# Patient Record
Sex: MALE | Race: WHITE | NOT HISPANIC OR LATINO | Employment: FULL TIME | URBAN - METROPOLITAN AREA
[De-identification: names, ages, dates, MRNs, and addresses within clinical notes are randomized per-mention and may not be internally consistent; named-entity substitution may affect disease eponyms.]

---

## 2022-08-26 ENCOUNTER — OFFICE VISIT (OUTPATIENT)
Dept: INTERNAL MEDICINE | Facility: CLINIC | Age: 47
End: 2022-08-26
Payer: COMMERCIAL

## 2022-08-26 ENCOUNTER — HOSPITAL ENCOUNTER (OUTPATIENT)
Dept: RADIOLOGY | Facility: HOSPITAL | Age: 47
Discharge: HOME | End: 2022-08-26
Attending: INTERNAL MEDICINE
Payer: COMMERCIAL

## 2022-08-26 VITALS
HEART RATE: 70 BPM | TEMPERATURE: 98 F | RESPIRATION RATE: 12 BRPM | WEIGHT: 273 LBS | SYSTOLIC BLOOD PRESSURE: 126 MMHG | HEIGHT: 69 IN | DIASTOLIC BLOOD PRESSURE: 70 MMHG | BODY MASS INDEX: 40.43 KG/M2

## 2022-08-26 DIAGNOSIS — Z12.11 COLON CANCER SCREENING: ICD-10-CM

## 2022-08-26 DIAGNOSIS — G89.29 CHRONIC PAIN OF BOTH KNEES: ICD-10-CM

## 2022-08-26 DIAGNOSIS — M25.562 CHRONIC PAIN OF BOTH KNEES: ICD-10-CM

## 2022-08-26 DIAGNOSIS — M25.561 CHRONIC PAIN OF BOTH KNEES: ICD-10-CM

## 2022-08-26 DIAGNOSIS — E66.01 OBESITY, CLASS III, BMI 40-49.9 (MORBID OBESITY): ICD-10-CM

## 2022-08-26 DIAGNOSIS — Z00.00 ANNUAL PHYSICAL EXAM: Primary | ICD-10-CM

## 2022-08-26 DIAGNOSIS — F98.8 ATTENTION DEFICIT DISORDER, UNSPECIFIED HYPERACTIVITY PRESENCE: ICD-10-CM

## 2022-08-26 PROBLEM — E66.813 OBESITY, CLASS III, BMI 40-49.9 (MORBID OBESITY): Status: ACTIVE | Noted: 2022-08-26

## 2022-08-26 PROCEDURE — 3008F BODY MASS INDEX DOCD: CPT | Mod: CPTII,S$GLB,, | Performed by: INTERNAL MEDICINE

## 2022-08-26 PROCEDURE — 99999 PR PBB SHADOW E&M-NEW PATIENT-LVL IV: CPT | Mod: PBBFAC,,, | Performed by: INTERNAL MEDICINE

## 2022-08-26 PROCEDURE — 3078F PR MOST RECENT DIASTOLIC BLOOD PRESSURE < 80 MM HG: ICD-10-PCS | Mod: CPTII,S$GLB,, | Performed by: INTERNAL MEDICINE

## 2022-08-26 PROCEDURE — 1159F PR MEDICATION LIST DOCUMENTED IN MEDICAL RECORD: ICD-10-PCS | Mod: CPTII,S$GLB,, | Performed by: INTERNAL MEDICINE

## 2022-08-26 PROCEDURE — 4010F ACE/ARB THERAPY RXD/TAKEN: CPT | Mod: CPTII,S$GLB,, | Performed by: INTERNAL MEDICINE

## 2022-08-26 PROCEDURE — 73562 X-RAY EXAM OF KNEE 3: CPT | Mod: 26,,, | Performed by: STUDENT IN AN ORGANIZED HEALTH CARE EDUCATION/TRAINING PROGRAM

## 2022-08-26 PROCEDURE — 4010F PR ACE/ARB THEARPY RXD/TAKEN: ICD-10-PCS | Mod: CPTII,S$GLB,, | Performed by: INTERNAL MEDICINE

## 2022-08-26 PROCEDURE — 3074F SYST BP LT 130 MM HG: CPT | Mod: CPTII,S$GLB,, | Performed by: INTERNAL MEDICINE

## 2022-08-26 PROCEDURE — 3074F PR MOST RECENT SYSTOLIC BLOOD PRESSURE < 130 MM HG: ICD-10-PCS | Mod: CPTII,S$GLB,, | Performed by: INTERNAL MEDICINE

## 2022-08-26 PROCEDURE — 3008F PR BODY MASS INDEX (BMI) DOCUMENTED: ICD-10-PCS | Mod: CPTII,S$GLB,, | Performed by: INTERNAL MEDICINE

## 2022-08-26 PROCEDURE — 1159F MED LIST DOCD IN RCRD: CPT | Mod: CPTII,S$GLB,, | Performed by: INTERNAL MEDICINE

## 2022-08-26 PROCEDURE — 99386 PR PREVENTIVE VISIT,NEW,40-64: ICD-10-PCS | Mod: S$GLB,,, | Performed by: INTERNAL MEDICINE

## 2022-08-26 PROCEDURE — 99386 PREV VISIT NEW AGE 40-64: CPT | Mod: S$GLB,,, | Performed by: INTERNAL MEDICINE

## 2022-08-26 PROCEDURE — 73562 X-RAY EXAM OF KNEE 3: CPT | Mod: TC,50,PO

## 2022-08-26 PROCEDURE — 99999 PR PBB SHADOW E&M-NEW PATIENT-LVL IV: ICD-10-PCS | Mod: PBBFAC,,, | Performed by: INTERNAL MEDICINE

## 2022-08-26 PROCEDURE — 3078F DIAST BP <80 MM HG: CPT | Mod: CPTII,S$GLB,, | Performed by: INTERNAL MEDICINE

## 2022-08-26 PROCEDURE — 73562 XR KNEE ORTHO BILAT: ICD-10-PCS | Mod: 26,,, | Performed by: STUDENT IN AN ORGANIZED HEALTH CARE EDUCATION/TRAINING PROGRAM

## 2022-08-26 RX ORDER — LISINOPRIL AND HYDROCHLOROTHIAZIDE 10; 12.5 MG/1; MG/1
1 TABLET ORAL DAILY
COMMUNITY
Start: 2022-08-19 | End: 2022-11-11 | Stop reason: SDUPTHER

## 2022-08-26 RX ORDER — METHYLPHENIDATE HYDROCHLORIDE 20 MG/1
20 CAPSULE, EXTENDED RELEASE ORAL DAILY PRN
COMMUNITY
Start: 2022-07-21 | End: 2022-09-07 | Stop reason: SDUPTHER

## 2022-08-26 NOTE — PROGRESS NOTES
Subjective:       Patient ID: Chi Carr is a 47 y.o. male.    Chief Complaint: Establish Care    HPI   47 y.o. Male here for annual exam.     Vaccines: Influenza (declined); Tetanus (will consider)  Eye exam: current   Colonoscopy: needs    Past Medical History:  No date: ADHD (attention deficit hyperactivity disorder)  No date: Hypertension  No date: Morbid obesity with BMI of 40.0-44.9, adult  No date: Other insomnia  No date: Prediabetes  History reviewed. No pertinent surgical history.  Social History    Socioeconomic History      Marital status:       Number of children: 1    Tobacco Use      Smoking status: Never Smoker      Smokeless tobacco: Never Used    Substance and Sexual Activity      Alcohol use: Yes        Comment: social      Drug use: Not Currently      Sexual activity: Yes        Partners: Female    Social History Narrative           Review of patient's allergies indicates:  No Known Allergies  Casimiro Carr had no medications administered during this visit.    Review of Systems   Constitutional: Negative for activity change, appetite change, chills, diaphoresis, fatigue, fever and unexpected weight change.   HENT: Negative for nasal congestion, hearing loss, mouth sores, postnasal drip, rhinorrhea, sinus pressure/congestion, sneezing, sore throat, trouble swallowing and voice change.    Eyes: Negative for discharge, itching and visual disturbance.   Respiratory: Negative for cough, chest tightness, shortness of breath and wheezing.    Cardiovascular: Negative for chest pain, palpitations and leg swelling.   Gastrointestinal: Negative for abdominal pain, blood in stool, constipation, diarrhea, nausea and vomiting.   Endocrine: Negative for cold intolerance, heat intolerance, polydipsia and polyuria.   Genitourinary: Negative for difficulty urinating, dysuria, flank pain, hematuria and urgency.   Musculoskeletal: Positive for arthralgias. Negative for back pain, joint swelling,  myalgias and neck pain.   Integumentary:  Negative for rash and wound.   Allergic/Immunologic: Negative for environmental allergies and food allergies.   Neurological: Negative for dizziness, tremors, seizures, syncope, weakness and headaches.   Hematological: Negative for adenopathy. Does not bruise/bleed easily.   Psychiatric/Behavioral: Negative for confusion, dysphoric mood, sleep disturbance and suicidal ideas. The patient is not nervous/anxious.          Objective:      Physical Exam  Constitutional:       General: He is not in acute distress.     Appearance: He is well-developed. He is not diaphoretic.   HENT:      Head: Normocephalic and atraumatic.      Right Ear: External ear normal.      Left Ear: External ear normal.      Nose: Nose normal.      Mouth/Throat:      Pharynx: No oropharyngeal exudate.   Eyes:      General: No scleral icterus.        Right eye: No discharge.         Left eye: No discharge.      Conjunctiva/sclera: Conjunctivae normal.      Pupils: Pupils are equal, round, and reactive to light.   Neck:      Thyroid: No thyromegaly.      Vascular: No JVD.   Cardiovascular:      Rate and Rhythm: Normal rate and regular rhythm.      Heart sounds: Normal heart sounds. No murmur heard.  Pulmonary:      Effort: Pulmonary effort is normal. No respiratory distress.      Breath sounds: Normal breath sounds. No wheezing or rales.   Abdominal:      General: Bowel sounds are normal. There is no distension.      Palpations: Abdomen is soft.      Tenderness: There is no abdominal tenderness. There is no guarding.   Musculoskeletal:      Cervical back: Normal range of motion and neck supple.      Right lower leg: No edema.      Left lower leg: No edema.   Lymphadenopathy:      Cervical: No cervical adenopathy.   Skin:     General: Skin is warm and dry.      Coloration: Skin is not pale.      Findings: No rash.   Neurological:      Mental Status: He is alert and oriented to person, place, and time.    Psychiatric:         Judgment: Judgment normal.         Assessment:       Problem List Items Addressed This Visit        Psychiatric    Attention deficit disorder       Endocrine    Obesity, Class III, BMI 40-49.9 (morbid obesity)      Other Visit Diagnoses     Annual physical exam    -  Primary    Relevant Orders    CBC Auto Differential    Comprehensive Metabolic Panel    TSH    Lipid Panel    Urinalysis    PSA, Screening    Hemoglobin A1C    Chronic pain of both knees        Relevant Orders    Ambulatory referral/consult to Orthopedics    X-Ray Knee 3 View Bilateral    Colon cancer screening        Relevant Orders    Case Request Endoscopy: COLONOSCOPY (Completed)          Plan:    Blood work ordered     Morbid Obesity- proper diet/exercise stressed     HTN- stable on Prinzide 10-12.5 mg qd      Chronic B/L knee pain- X-rays and referral to ortho     ADD- stable on Ritalin PRN     F/u in 1 yr

## 2022-08-29 ENCOUNTER — OFFICE VISIT (OUTPATIENT)
Dept: SPORTS MEDICINE | Facility: CLINIC | Age: 47
End: 2022-08-29
Payer: COMMERCIAL

## 2022-08-29 VITALS
BODY MASS INDEX: 40.14 KG/M2 | HEART RATE: 61 BPM | WEIGHT: 271 LBS | SYSTOLIC BLOOD PRESSURE: 129 MMHG | HEIGHT: 69 IN | DIASTOLIC BLOOD PRESSURE: 82 MMHG

## 2022-08-29 DIAGNOSIS — M25.561 CHRONIC PAIN OF BOTH KNEES: ICD-10-CM

## 2022-08-29 DIAGNOSIS — M25.562 CHRONIC PAIN OF BOTH KNEES: ICD-10-CM

## 2022-08-29 DIAGNOSIS — G89.29 CHRONIC PAIN OF BOTH KNEES: ICD-10-CM

## 2022-08-29 PROCEDURE — 3074F SYST BP LT 130 MM HG: CPT | Mod: CPTII,S$GLB,, | Performed by: ORTHOPAEDIC SURGERY

## 2022-08-29 PROCEDURE — 99999 PR PBB SHADOW E&M-EST. PATIENT-LVL III: CPT | Mod: PBBFAC,,, | Performed by: ORTHOPAEDIC SURGERY

## 2022-08-29 PROCEDURE — 3079F PR MOST RECENT DIASTOLIC BLOOD PRESSURE 80-89 MM HG: ICD-10-PCS | Mod: CPTII,S$GLB,, | Performed by: ORTHOPAEDIC SURGERY

## 2022-08-29 PROCEDURE — 3008F BODY MASS INDEX DOCD: CPT | Mod: CPTII,S$GLB,, | Performed by: ORTHOPAEDIC SURGERY

## 2022-08-29 PROCEDURE — 3079F DIAST BP 80-89 MM HG: CPT | Mod: CPTII,S$GLB,, | Performed by: ORTHOPAEDIC SURGERY

## 2022-08-29 PROCEDURE — 99204 OFFICE O/P NEW MOD 45 MIN: CPT | Mod: S$GLB,,, | Performed by: ORTHOPAEDIC SURGERY

## 2022-08-29 PROCEDURE — 3044F PR MOST RECENT HEMOGLOBIN A1C LEVEL <7.0%: ICD-10-PCS | Mod: CPTII,S$GLB,, | Performed by: ORTHOPAEDIC SURGERY

## 2022-08-29 PROCEDURE — 1159F MED LIST DOCD IN RCRD: CPT | Mod: CPTII,S$GLB,, | Performed by: ORTHOPAEDIC SURGERY

## 2022-08-29 PROCEDURE — 99999 PR PBB SHADOW E&M-EST. PATIENT-LVL III: ICD-10-PCS | Mod: PBBFAC,,, | Performed by: ORTHOPAEDIC SURGERY

## 2022-08-29 PROCEDURE — 99204 PR OFFICE/OUTPT VISIT, NEW, LEVL IV, 45-59 MIN: ICD-10-PCS | Mod: S$GLB,,, | Performed by: ORTHOPAEDIC SURGERY

## 2022-08-29 PROCEDURE — 3074F PR MOST RECENT SYSTOLIC BLOOD PRESSURE < 130 MM HG: ICD-10-PCS | Mod: CPTII,S$GLB,, | Performed by: ORTHOPAEDIC SURGERY

## 2022-08-29 PROCEDURE — 4010F ACE/ARB THERAPY RXD/TAKEN: CPT | Mod: CPTII,S$GLB,, | Performed by: ORTHOPAEDIC SURGERY

## 2022-08-29 PROCEDURE — 3044F HG A1C LEVEL LT 7.0%: CPT | Mod: CPTII,S$GLB,, | Performed by: ORTHOPAEDIC SURGERY

## 2022-08-29 PROCEDURE — 3008F PR BODY MASS INDEX (BMI) DOCUMENTED: ICD-10-PCS | Mod: CPTII,S$GLB,, | Performed by: ORTHOPAEDIC SURGERY

## 2022-08-29 PROCEDURE — 1159F PR MEDICATION LIST DOCUMENTED IN MEDICAL RECORD: ICD-10-PCS | Mod: CPTII,S$GLB,, | Performed by: ORTHOPAEDIC SURGERY

## 2022-08-29 PROCEDURE — 4010F PR ACE/ARB THEARPY RXD/TAKEN: ICD-10-PCS | Mod: CPTII,S$GLB,, | Performed by: ORTHOPAEDIC SURGERY

## 2022-08-29 NOTE — PROGRESS NOTES
CC: Bilateral knee pain    47 y.o. Male with a history of Bilateral (right>left) knee pain. Pt is self-referred and works primarily as a  but has some job site duties as well. Denies specific HILARY but has experienced several episodes of hyperextension in right knee. Pt rates right knee pain as 1/10 at rest and 6/10 at worst. Pt has given up playing tennis and racquetball due to previous knee injuries. Pt also sleeps with his feet and knees elevated due to sleep apnea. Pt has tried conservative treatment and failed.     - mechanical symptoms, no instability    Is affecting ADLs.      SANE: R: 50, L: 75    Review of Systems   Constitution: Negative. Negative for chills, fever and night sweats.   HENT: Negative for congestion and headaches.    Eyes: Negative for blurred vision, left vision loss and right vision loss.   Cardiovascular: Negative for chest pain and syncope.   Respiratory: Negative for cough and shortness of breath.    Endocrine: Negative for polydipsia, polyphagia and polyuria.   Hematologic/Lymphatic: Negative for bleeding problem. Does not bruise/bleed easily.   Skin: Negative for dry skin, itching and rash.   Musculoskeletal: Negative for falls. Positive for knee pain and muscle weakness.   Gastrointestinal: Negative for abdominal pain and bowel incontinence.   Genitourinary: Negative for bladder incontinence and nocturia.   Neurological: Negative for disturbances in coordination, loss of balance and seizures.   Psychiatric/Behavioral: Negative for depression. The patient does not have insomnia.    Allergic/Immunologic: Negative for hives and persistent infections.     PAST MEDICAL HISTORY:   Past Medical History:   Diagnosis Date    ADHD (attention deficit hyperactivity disorder)     Hypertension     Morbid obesity with BMI of 40.0-44.9, adult     Other insomnia     Prediabetes      PAST SURGICAL HISTORY: History reviewed. No pertinent surgical history.  FAMILY HISTORY:   Family History  "  Problem Relation Age of Onset    Cancer Mother         sarcoma    Prostate cancer Father     Diabetes Maternal Grandmother     Heart disease Neg Hx     Stroke Neg Hx      SOCIAL HISTORY:   Social History     Socioeconomic History    Marital status:     Number of children: 1   Tobacco Use    Smoking status: Never    Smokeless tobacco: Never   Substance and Sexual Activity    Alcohol use: Yes     Comment: social    Drug use: Not Currently    Sexual activity: Yes     Partners: Female   Social History Narrative            MEDICATIONS:   Current Outpatient Medications:     lisinopriL-hydrochlorothiazide (PRINZIDE,ZESTORETIC) 10-12.5 mg per tablet, Take 1 tablet by mouth once daily., Disp: , Rfl:     methylphenidate HCl (RITALIN LA) 20 MG 24 hr capsule, Take 20 mg by mouth daily as needed., Disp: , Rfl:   ALLERGIES: Review of patient's allergies indicates:  No Known Allergies    VITAL SIGNS: /82   Pulse 61   Ht 5' 9" (1.753 m)   Wt 122.9 kg (271 lb)   BMI 40.02 kg/m²      PHYSICAL EXAMINATION  VITAL SIGNS: /82   Pulse 61   Ht 5' 9" (1.753 m)   Wt 122.9 kg (271 lb)   BMI 40.02 kg/m²    General:  The patient is alert and oriented x 3.  Mood is pleasant.  Observation of ears, eyes and nose reveal no gross abnormalities.  HEENT: NCAT, sclera nonicteric  Lungs: Respirations are equal and unlabored.    Bilateral KNEE EXAMINATION     OBSERVATION / INSPECTION   Gait:   Nonantalgic   Alignment:  Neutral   Scars:   None   Muscle atrophy: Mild  Effusion:  None   Warmth:  None   Discoloration:   none     TENDERNESS / CREPITUS (T / C):          T / C      T / C   Patella   - / -   Lateral joint line   - / -    Peripatellar medial  -  Medial joint line    - / -    Peripatellar lateral -  Medial plica   - / -    Patellar tendon -   Popliteal fossa  - / -    Quad tendon   -   Gastrocnemius   -   Prepatellar Bursa - / -   Quadricep   -   Tibial tubercle  -  Thigh/hamstring  -   Pes " anserine/HS -  Fibula    -   ITB   - / -  Tibia     -   Tib/fib joint  - / -  LCL    -     MFC   - / -   MCL: Proximal  -    LFC   - / -    Distal   -          ROM: (* = pain)  PASSIVE   ACTIVE    Left :    0  115   5 / 0 / 110     Right :    5  0 / 115   5 / 0 / 110    PATELLOFEMORAL EXAMINATION:  See above noted areas of tenderness.   Patella position    Subluxation / dislocation: Centered           Sup. / Inf;   Normal   Crepitus (PF):    Absent   Patellar Mobility:       Medial-lateral:   Normal    Superior-inferior:  Normal    Inferior tilt   Normal    Patellar tendon:  Normal   Lateral tilt:    Normal   J-sign:     None   Patellofemoral grind:   No pain       MENISCAL SIGNS:     Pain on terminal extension:  -  Pain on terminal flexion:  -  Efrains maneuver:  - for pain  Squat     - posterior joint pain    LIGAMENT EXAMINATION:  ACL / Lachman:  normal (-1 to 2mm)    PCL-Post.  drawer: normal 0 to 2mm  MCL- Valgus:  normal 0 to 2mm  LCL- Varus:  normal 0 to 2mm  Pivot shift: normal (Equal)   Dial Test: difference c/w other side   At 30° flexion: normal (< 5°)    At 90° flexion: normal (< 5°)   Reverse Pivot Shift:   normal (Equal)     STRENGTH: (* = with pain) PAINFUL SIDE   Quadricep   5/5   Hamstrin/5    EXTREMITY NEURO-VASCULAR EXAMINATION:   Sensation:  Grossly intact to light touch all dermatomal regions.   Motor Function:  Fully intact motor function at hip, knee, foot and ankle    DTRs;  quadriceps and  achilles 2+.  No clonus and downgoing Babinski.    Vascular status:  DP and PT pulses 2+, brisk capillary refill, symmetric.     Other Findings:  + bridge  + step down  Decreased glut activation       X-rays reviewed and interpreted personally by me:  including standing, weight bearing AP and flexion bilateral knees, lateral and merchant views ordered and images reviewed by me show:  No fracture, dislocation     ASSESSMENT:    Bilateral Knee Pain due to glut and core weakness    PLAN:    PT for glut and core strengthening  Follow up PRN  All questions were answered, pt will contact us for questions or concerns in the interim.

## 2022-09-01 ENCOUNTER — CLINICAL SUPPORT (OUTPATIENT)
Dept: REHABILITATION | Facility: HOSPITAL | Age: 47
End: 2022-09-01
Attending: ORTHOPAEDIC SURGERY
Payer: COMMERCIAL

## 2022-09-01 DIAGNOSIS — M25.561 CHRONIC PAIN OF BOTH KNEES: ICD-10-CM

## 2022-09-01 DIAGNOSIS — M25.562 CHRONIC PAIN OF BOTH KNEES: ICD-10-CM

## 2022-09-01 DIAGNOSIS — M25.569 MEDIAL KNEE PAIN, UNSPECIFIED LATERALITY: ICD-10-CM

## 2022-09-01 DIAGNOSIS — G89.29 CHRONIC PAIN OF BOTH KNEES: ICD-10-CM

## 2022-09-01 PROCEDURE — 97140 MANUAL THERAPY 1/> REGIONS: CPT

## 2022-09-01 PROCEDURE — 97110 THERAPEUTIC EXERCISES: CPT

## 2022-09-01 PROCEDURE — 97161 PT EVAL LOW COMPLEX 20 MIN: CPT

## 2022-09-01 NOTE — PLAN OF CARE
[facility]  Physical Therapy Initial Evaluation    Name: Chi Carr  Clinic Number: [id number]    Therapy Diagnosis:   Encounter Diagnosis   Name Primary?    Chronic pain of both knees      Physician: Sharon Cosme MD    Physician Orders: PT Eval and Treat   Medical Diagnosis from Referral: Chronic pain of both knees [M25.561, M25.562, G89.29]  Evaluation Date: 9/1/2022  Authorization Period Expiration: 9/1/2022 - 12/31/2022  Plan of Care Expiration: 10/27/22  Visit # / Visits authorized: 1/ 1    Time In: 1600  Time Out: 1700  Total Billable Time: 60 minutes    Precautions: Standard,     Subjective   Date of onset: 1 year ago  History of current condition - Casimiro reports: he developed his medial bilat knee pain one year ago with no specific incident. States that the Right knee hurts him the most but his left knee hurts as well. States he used to play racquet ball and tennis, falling on his knees a lot but no overall occurrence to elicit this pain. Pt states he hyper extends his knees a lot and plays golf now with moderate pain. Pt denies radicular sx/s and has PMH of low back pain. 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 States he would like to lose weight and get back to PLOF.        Past Medical History:   Diagnosis Date    ADHD (attention deficit hyperactivity disorder)     Hypertension     Morbid obesity with BMI of 40.0-44.9, adult     Other insomnia     Prediabetes      Chi Carr  has no past surgical history on file.    Chi has a current medication list which includes the following prescription(s): lisinopril-hydrochlorothiazide and methylphenidate hcl.    Review of patient's allergies indicates:  No Known Allergies     Imaging,   FINDINGS:  Right knee: No acute fracture or dislocation.  No aggressive osseous lesion.  Slight lateral position of patella in the trochlear groove.  Cartilage spaces are adequately maintained.  No  significant joint effusion.  No significant soft tissue abnormality.     Left knee:.  No acute fracture dislocation.  No aggressive osseous lesion.  Slight lateral positioning of patella in the trochlear groove.  Cartilage spaces are adequately maintained. Mild enthesopathy at distal quadriceps insertion site.  No significant joint effusion.  No significant soft tissue abnormality.    Prior Therapy: none  Social History:  lives with their family  Occupation: sedentary   Prior Level of Function: Pt was able to walk 1 mile, sit to stand and golf with minimal pain.   Current Level of Function: Pt is able to walk .25 mile, sit to stand and golf with 6/10 pain on most occasions.     Pain:  Current 3/10, worst 7/10, best 1/10   Location: bilateral medial knee   Description: Aching  Aggravating Factors: Standing, Touching, Walking, Night Time, Morning, Extension, Flexing, Lifting, and Getting out of bed/chair  Easing Factors: rest    Pts goals: return to ADLs and golf without pain     Objective     Gait   - decrease WB on LLE  - bilat [place]  - decrease Right arm swing and Right thoracic spine rotation    Functional tests:   SL squat: bilat hip drop  DL squat: decrease DF, fwd knee translation    Knee Range of Motion:   Right  Left    Active 5-0-125 10-0-130   Passive 10-0-125 10-0-130     Hip Range of Motion  Flexon : Right= 90 Left = 100  IR: Right = 15 Left= 25  ER: Right = 20 Left= 30     Yasmany test  Right=  post fossa 8'' from table 90 deg knee flexion  Left = post fossa 4'' form table and 75 deg knee flexion     Lower Extremity Strength  Right LE  Left LE    Quadriceps: 4+/5 Quadriceps: 5/5   Hamstrings: 4+/5 Hamstrings: 5/5   Hip flexion (supine): 4/5 Hip flexion (supine): 4/5   Hip extension:  3-/5 Hip extension: 3+/5   Hip ER:  3+/5 Hip ER:  3+/5   Hip IR: 4+/5 Hip IR: 4+/5     Special Tests:   Right Left   Valgus Stress Test - -   Varus Stress test - -   Lachman's test - -   Posterior Lachman      Dianna's Test - -   Thessaly's Test - -   Patellar Grind Test - -     Joint Mobility: hyper mobile of tib-fem joint      Palpation: tender to superficial palpation of medial border of Right patella     Sensation: WFL      CMS Impairment/Limitation/Restriction for FOTO knee  Survey    Therapist reviewed FOTO scores for Chi Carr on 9/1/2022.   FOTO documents entered into [employer] - see Media section.    Limitation Score: 69%  Category: Mobility         TREATMENT     Total Treatment time separate from Evaluation: 42 minutes    Casimiro received therapeutic exercises to develop for 31 minutes including:  Pt edu on hip biomechanics, lumbar referral, neurodynamic, activity modification, POC and HEP  Pronequad stretch  Glute sets 10x 10''  SL clams  2x10 ea  SL hip ER x10 ea  Bridge 2x 10       Casimiro received the following manual therapy techniques: or 11 minutes, including:  Right FA jt. Inf /post/lat mob  distraction    Home Exercises and Patient Education Provided    Education provided re: hip biomechanics, lumbar referral, neurodynamic, activity modification, POC, goals for therapy, role of therapy for care, exercises/HEP    Written Home Exercises Provided: yes.  Exercises were reviewed and Casimiro was able to demonstrate them prior to the end of the session.   Pt received a written copy of exercises to perform at home. Casimiro demonstrated good  understanding of the education provided.     See EMR under patient instructions for exercises given.   Assessment   Chi is a 47 y.o. male referred to outpatient Physical Therapy with a medical diagnosis of Chronic pain of both knees [M25.561, M25.562, G89.292. Pt presents with bilat medial knee pain. Pt has deficits in decreased range of motion, decreased joint mobility, decrease strength/endurance, decrease neuro-muscular control, and increased pain This limits the pts ability to walk, bend kneel, lift, and exercise. Pt is a good candidate for skilled physical  therapy treatment to address the above limitations through manual therapy techniques, neuro- reeducation , strength/endurance program, gait training and patient education .     Pt prognosis is Good.   Pt will benefit from skilled outpatient Physical Therapy to address the deficits stated above and in the chart below, provide pt/family education, and to maximize pt's level of independence.     Plan of care discussed with patient: Yes  Pt's spiritual, cultural and educational needs considered and patient is agreeable to the plan of care and goals as stated below:     Anticipated Barriers for therapy: none    Medical Necessity is demonstrated by the following  History  Co-morbidities and personal factors that may impact the plan of care Co-morbidities:   high BMI    Personal Factors:   no deficits     low   Examination  Body Structures and Functions, activity limitations and participation restrictions that may impact the plan of care Body Regions:   lower extremities    Body Systems:    gross symmetry  ROM  strength  gross coordinated movement  gait  motor control    Participation Restrictions:   none    Activity limitations:   Learning and applying knowledge  no deficits    General Tasks and Commands  no deficits    Communication  no deficits    Mobility  no deficits    Self care  no deficits    Domestic Life  no deficits    Interactions/Relationships  no deficits    Life Areas  no deficits    Community and Social Life  no deficits         low   Clinical Presentation stable and uncomplicated low   Decision Making/ Complexity Score: low     Goals:  Short Term Goals: 2-4 weeks   Pt will be able to demonstrate 5 deg and .5 inch decrease in melissa test of RLE.  Pt will be able to demonstrate 2x10 bridges with proper form.  Pt will be able to demonstrate > 5 deg increase in hip Range of Motion RLE.  Pt will be able to demonstrate walking .25 mile with no pain.    Long Term Goals: 4-8 weeks   Pt will be able to demonstrate  BW squat x10 with equal WB.   Pt will be able to demonstrate walking >1 mile with no pain.  Pt will subjectively report decrease in pain with ADLs.        Plan   Plan of care Certification: 9/1/2022 to 10/27/22.    Outpatient Physical Therapy 2 times weekly for 8 weeks to include the following interventions: Electrical Stimulation ,, Gait Training, Manual Therapy, Moist Heat/ Ice, Neuromuscular Re-ed, Patient Education, Therapeutic Activities, and Therapeutic Exercise.     Page Alarcon, PT, DPT

## 2022-09-06 PROBLEM — M25.569 MEDIAL KNEE PAIN: Status: ACTIVE | Noted: 2022-09-06

## 2022-09-07 ENCOUNTER — CLINICAL SUPPORT (OUTPATIENT)
Dept: REHABILITATION | Facility: HOSPITAL | Age: 47
End: 2022-09-07
Attending: FAMILY MEDICINE
Payer: COMMERCIAL

## 2022-09-07 ENCOUNTER — PATIENT MESSAGE (OUTPATIENT)
Dept: INTERNAL MEDICINE | Facility: CLINIC | Age: 47
End: 2022-09-07
Payer: COMMERCIAL

## 2022-09-07 DIAGNOSIS — M25.561 RIGHT MEDIAL KNEE PAIN: Primary | ICD-10-CM

## 2022-09-07 PROCEDURE — 97140 MANUAL THERAPY 1/> REGIONS: CPT

## 2022-09-07 PROCEDURE — 97110 THERAPEUTIC EXERCISES: CPT

## 2022-09-07 PROCEDURE — 97112 NEUROMUSCULAR REEDUCATION: CPT

## 2022-09-07 RX ORDER — METHYLPHENIDATE HYDROCHLORIDE 20 MG/1
20 CAPSULE, EXTENDED RELEASE ORAL DAILY
Qty: 30 CAPSULE | Refills: 0 | Status: SHIPPED | OUTPATIENT
Start: 2022-09-07 | End: 2022-11-11 | Stop reason: SDUPTHER

## 2022-09-07 NOTE — PROGRESS NOTES
Physical Therapy Daily Treatment Note     Name: Chi Carr  Clinic Number: [id number]    Therapy Diagnosis:   Encounter Diagnosis   Name Primary?    Right medial knee pain Yes     Physician: Sharon Cosme MD    Visit Date: 9/7/2022     Physician Orders: PT Eval and Treat   Medical Diagnosis from Referral: Chronic pain of both knees [M25.561, M25.562, G89.29]  Evaluation Date: 9/1/2022  Authorization Period Expiration: 9/1/2022 - 12/31/2022  Plan of Care Expiration: 10/27/22  Visit # / Visits authorized: 1/ 1    FOTO 1st:  FOTO 5th:  FOTO 10th:     Time In: 800  Time Out: 900  Total Billable Time: 56 minutes    Precautions: Standard    Subjective     Pt reports: that he worked all weekend at the house kneeling and squatting.  He was compliant with home exercise program.  Response to previous treatment: decrease pain  Functional change: progressing    Pain: 1/10  Location: bilateral ant knee      Objective     9/7/22  Knee Range of Motion:    Right  Left    Active 10-0-130 10-0-130   Passive 10-0-130 10-0-130      Hip Range of Motion  Flexon : Right= 90 Left = 100  IR: Right = 25 Left= 25  ER: Right = 30 Left= 30      Yasmany test  Right=  post fossa 8'' from table 90 deg knee flexion  Left = post fossa 4'' form table and 75 deg knee flexion       Casimiro received therapeutic exercises to develop for 11 minutes including:  Stationary bike lvl 3' 65 RPM 3' 75 RPM  Prone quad stretch 5x 30'' ea leg  SL hip ER 3x 8 ea side       Casimiro received the following manual therapy techniques: for 9 minutes, including:  Inf/post FA jt mobs Right hip   Distraction     Casimiro participated in neuromuscular re-education activities to improve: for 35 minutes. The following activities were included:  Glute sets 10x 10  Bridge 3x8   SL stance mini squat 2x 30'' ea LE  SL stance mini squat with hip ABD 2x 10  SL stance mini squat form with stance leg hip ER 4x5 ea LE     Casimiro participated in dynamic functional therapeutic activities to  improve functional performance for 00  minutes, including:      Casimiro participated in gait training to improve functional mobility and safety for 00  minutes, including:      Home Exercises Provided and Patient Education Provided     Education provided:   - form and glute activation     Written Home Exercises Provided: Patient instructed to cont prior HEP.  Exercises were reviewed and Casimiro was able to demonstrate them prior to the end of the session.  Casimiro demonstrated good  understanding of the education provided.     See EMR under Patient Instructions for exercises provided prior visit.    Assessment     Pt presents with minimal decrease in pain. Treatment focuses on improving hip mobility and neuro-muscular control of glutes. Pt needed cueing to promote glute activation with a posterior pelvic tilt during bridge. Pt benefited from glute sets and cueing with activation reported after the first set. Added CKC balance activities and glute activation in standing to promote functional carryover. Pt tolerated this well with moderate cueing needed for form and to promote pelvic rotation along the z-axis.       Casimiro Is progressing well towards his goals.   Pt prognosis is Excellent.     Pt will continue to benefit from skilled outpatient physical therapy to address the deficits listed in the problem list box on initial evaluation, provide pt/family education and to maximize pt's level of independence in the home and community environment.     Pt's spiritual, cultural and educational needs considered and pt agreeable to plan of care and goals.    Anticipated barriers to physical therapy: none    Goals:  Short Term Goals: 2-4 weeks   Pt will be able to demonstrate 5 deg and .5 inch decrease in melisas test of RLE.  Pt will be able to demonstrate 2x10 bridges with proper form.  Pt will be able to demonstrate > 5 deg increase in hip Range of Motion RLE.  Pt will be able to demonstrate walking .25 mile with no pain.      Long Term Goals: 4-8 weeks   Pt will be able to demonstrate BW squat x10 with equal WB.   Pt will be able to demonstrate walking >1 mile with no pain.  Pt will subjectively report decrease in pain with ADLs.      Plan     Improve hip mobility and strengthen with new Range of Motion to promote functional carryover    Page Alarcon, PT, DPT

## 2022-09-07 NOTE — TELEPHONE ENCOUNTER
No new care gaps identified.  [facility] Embedded Care Gaps. Reference number: [id number]. 9/07/2022   4:35:03 PM CDT

## 2022-09-13 ENCOUNTER — CLINICAL SUPPORT (OUTPATIENT)
Dept: REHABILITATION | Facility: HOSPITAL | Age: 47
End: 2022-09-13
Attending: ORTHOPAEDIC SURGERY
Payer: COMMERCIAL

## 2022-09-13 DIAGNOSIS — M25.562 LEFT MEDIAL KNEE PAIN: Primary | ICD-10-CM

## 2022-09-13 PROCEDURE — 97140 MANUAL THERAPY 1/> REGIONS: CPT

## 2022-09-13 PROCEDURE — 97110 THERAPEUTIC EXERCISES: CPT

## 2022-09-13 PROCEDURE — 97112 NEUROMUSCULAR REEDUCATION: CPT

## 2022-09-13 PROCEDURE — 97530 THERAPEUTIC ACTIVITIES: CPT

## 2022-09-13 NOTE — PROGRESS NOTES
"  Physical Therapy Daily Treatment Note     Name: Chi Carr  Clinic Number: [id number]    Therapy Diagnosis:   Encounter Diagnosis   Name Primary?    Left medial knee pain Yes     Physician: Sharon Cosme MD    Visit Date: 9/13/2022     Physician Orders: PT Eval and Treat   Medical Diagnosis from Referral: Chronic pain of both knees [M25.561, M25.562, G89.29]  Evaluation Date: 9/1/2022  Authorization Period Expiration: 9/1/2022 - 12/31/2022  Plan of Care Expiration: 10/27/22  Visit # / Visits authorized: 1/ 1    FOTO 1st:  FOTO 5th:  FOTO 10th:     Time In: 800  Time Out: 900  Total Billable Time: 56 minutes    Precautions: Standard    Subjective     Pt reports: that [place] walks [place] the [place]. States he had his "sciatic pain" on the drive over here. States his knee pain was not present throughout the day until he got out of his truck after the sciatic pain set in. States it is mid glute to mid back of his thigh.     He was compliant with home exercise program.  Response to previous treatment: decrease pain  Functional change: progressing    Pain: 1/10  Location: bilateral ant knee      Objective     9/7/22  Knee Range of Motion:    Right  Left    Active 10-0-130 10-0-130   Passive 10-0-130 10-0-130      Hip Range of Motion  Flexon : Right= 90 Left = 100  IR: Right = 25 Left= 25  ER: Right = 30 Left= 30      Yasmany test  Right=  post fossa 8'' from table 90 deg knee flexion  Left = post fossa 4'' form table and 75 deg knee flexion     9/13/22  - tender to superficial palpation over L3 and L4 SP along with paraspinals between L3-4 region with reproduction of his/this pain with slump testing       Casimiro received therapeutic exercises to develop for 10 minutes including:  Slump nerve sliders 2x 15   SL hip ER 2x 8 ea side     Not today  Prone quad stretch 5x 30'' ea leg    Casimiro received the following manual therapy techniques: for 16 minutes, including:  Inf/post FA jt mobs Right hip   Distraction   Gr 5 " lumbar roll mob L4-5    Casimiro participated in neuromuscular re-education activities to improve: for 22 minutes. The following activities were included:  Ant/post sagittal plane pelvic re-training  Lateral frontal plane re-training   Hooklying foot off weight to control pelvic neutral 12'   Bridge 2x8   SL clams with post tilt and lateral pelvic lift       Not today  SL stance mini squat 2x 30'' ea LE  SL stance mini squat with hip ABD 2x 10  SL stance mini squat form with stance leg hip ER 4x5 ea LE     Casimiro participated in dynamic functional therapeutic activities to improve functional performance for 8 minutes, including:  Pt edu on pelvic control in standing  Paloff press split stance 3x5 ea foot fwd ea way    Casimiro participated in gait training to improve functional mobility and safety for 00  minutes, including:      Home Exercises Provided and Patient Education Provided     Education provided:   - form and glute activation     Written Home Exercises Provided: Patient instructed to cont prior HEP.  Exercises were reviewed and Casimiro was able to demonstrate them prior to the end of the session.  Casimiro demonstrated good  understanding of the education provided.     See EMR under Patient Instructions for exercises provided prior visit.    Assessment     Pt presents with (+) Right and Left slump testing with increase pain in Right at the level of L3-4. Pt responded well to manual therapy techniques with (-) slump testing after. Treatment focuses strictly on neuro-muscular control of pelvic stability and control. Pt tolerated this well with moderate tactial cueing needed for correct pelvic alignment. This transitioned well into standing paloff press.       Casimiro Is progressing well towards his goals.   Pt prognosis is Excellent.     Pt will continue to benefit from skilled outpatient physical therapy to address the deficits listed in the problem list box on initial evaluation, provide pt/family education and to  maximize pt's level of independence in the home and community environment.     Pt's spiritual, cultural and educational needs considered and pt agreeable to plan of care and goals.    Anticipated barriers to physical therapy: none    Goals:  Short Term Goals: 2-4 weeks   Pt will be able to demonstrate 5 deg and .5 inch decrease in melissa test of RLE.  Pt will be able to demonstrate 2x10 bridges with proper form.  Pt will be able to demonstrate > 5 deg increase in hip Range of Motion RLE.  Pt will be able to demonstrate walking .25 mile with no pain.     Long Term Goals: 4-8 weeks   Pt will be able to demonstrate BW squat x10 with equal WB.   Pt will be able to demonstrate walking >1 mile with no pain.  Pt will subjectively report decrease in pain with ADLs.      Plan     Improve hip mobility and strengthen with new Range of Motion to promote functional carryover    Page Alarcon, PT, DPT

## 2022-09-15 ENCOUNTER — CLINICAL SUPPORT (OUTPATIENT)
Dept: REHABILITATION | Facility: HOSPITAL | Age: 47
End: 2022-09-15
Attending: ORTHOPAEDIC SURGERY
Payer: COMMERCIAL

## 2022-09-15 DIAGNOSIS — M25.562 LEFT MEDIAL KNEE PAIN: Primary | ICD-10-CM

## 2022-09-15 PROCEDURE — 97110 THERAPEUTIC EXERCISES: CPT

## 2022-09-15 PROCEDURE — 97140 MANUAL THERAPY 1/> REGIONS: CPT

## 2022-09-15 PROCEDURE — 97530 THERAPEUTIC ACTIVITIES: CPT

## 2022-09-15 NOTE — PROGRESS NOTES
Physical Therapy Daily Treatment Note     Name: Chi Carr  Clinic Number: [id number]    Therapy Diagnosis:   Encounter Diagnosis   Name Primary?    Left medial knee pain Yes     Physician: Sharon Cosme MD    Visit Date: 9/15/2022     Physician Orders: PT Eval and Treat   Medical Diagnosis from Referral: Chronic pain of both knees [M25.561, M25.562, G89.29]  Evaluation Date: 9/1/2022  Authorization Period Expiration: 9/1/2022 - 12/31/2022  Plan of Care Expiration: 10/27/22  Visit # / Visits authorized: 1/ 1 3/20    FOTO 1st:  FOTO 5th:  FOTO 10th:     Time In: 0700  Time Out: 0800  Total Billable Time: 60 minutes    Precautions: Standard    Subjective     Pt reports: no new complaints    He was compliant with home exercise program.  Response to previous treatment: decrease pain  Functional change: progressing    Pain: 1/10  Location: bilateral ant knee      Objective     9/15/22  Knee Range of Motion:    Right  Left    Active 10-0-130 10-0-130   Passive 10-0-130 10-0-130      Hip Range of Motion  Flexon : Right= 90 Left = 100  IR: Right = 25 Left= 25  ER: Right = 30 Left= 30      Yasmany test  Right=  post fossa 8'' from table 90 deg knee flexion  Left = post fossa 4'' form table and 75 deg knee flexion     9/13/22  - tender to superficial palpation over L3 and L4 SP along with paraspinals between L3-4 region with reproduction of his/this pain with slump testing       Casimiro received therapeutic exercises to develop for 00 minutes including:    Not today  Slump nerve sliders 2x 15   SL hip ER 2x 8 ea side   Prone quad stretch 5x 30'' ea leg    Casimiro received the following manual therapy techniques: for 11 minutes, including:  Inf/post FA jt mobs Right hip   Distraction     Not today  Gr 5 lumbar roll mob L4-5    Casimiro participated in neuromuscular re-education activities to improve: for 18 minutes. The following activities were included:  SL clams with post tilt and lateral pelvic lift 3x5 ea leg  Bridge 2x8    Hip hinge re-training  Hip hinge at cable column 3x10 7#  Hip hinge with orange sports cord  2x 10      Not today  Hooklying foot off weight to control pelvic neutral 12  Ant/post sagittal plane pelvic re-training  Lateral frontal plane re-training   SL stance mini squat 2x 30'' ea LE  SL stance mini squat with hip ABD 2x 10  SL stance mini squat form with stance leg hip ER 4x5 ea LE     Casimiro participated in dynamic functional therapeutic activities to improve functional performance for 27 minutes, including:  Pt edu on pelvic control in standing  Paloff press 2x10 ea way   Hip hinge with shoulder ext 3x10       Casimiro participated in gait training to improve functional mobility and safety for 00  minutes, including:      Home Exercises Provided and Patient Education Provided     Education provided:   - form and glute activation     Written Home Exercises Provided: Patient instructed to cont prior HEP.  Exercises were reviewed and Casimiro was able to demonstrate them prior to the end of the session.  Casimiro demonstrated good  understanding of the education provided.     See EMR under Patient Instructions for exercises provided prior visit.    Assessment   Pt presents with improved him Range of Motion and good tolerance to last treatment. Added hip hinge patterning to promote functional patterning and continue glute strengthening. Added shoulder ext with hip hinge pattern with to promote posterior sling strengthening. Will continue to progress next visit if today was tolerated well.       Casimiro Is progressing well towards his goals.   Pt prognosis is Excellent.     Pt will continue to benefit from skilled outpatient physical therapy to address the deficits listed in the problem list box on initial evaluation, provide pt/family education and to maximize pt's level of independence in the home and community environment.     Pt's spiritual, cultural and educational needs considered and pt agreeable to plan of care and  goals.    Anticipated barriers to physical therapy: none    Goals:  Short Term Goals: 2-4 weeks   Pt will be able to demonstrate 5 deg and .5 inch decrease in melissa test of RLE.  Pt will be able to demonstrate 2x10 bridges with proper form.  Pt will be able to demonstrate > 5 deg increase in hip Range of Motion RLE.  Pt will be able to demonstrate walking .25 mile with no pain.     Long Term Goals: 4-8 weeks   Pt will be able to demonstrate BW squat x10 with equal WB.   Pt will be able to demonstrate walking >1 mile with no pain.  Pt will subjectively report decrease in pain with ADLs.      Plan     Improve hip mobility and strengthen with new Range of Motion to promote functional carryover    Page Alarcon, PT, DPT

## 2022-09-21 ENCOUNTER — CLINICAL SUPPORT (OUTPATIENT)
Dept: REHABILITATION | Facility: HOSPITAL | Age: 47
End: 2022-09-21
Attending: ORTHOPAEDIC SURGERY
Payer: COMMERCIAL

## 2022-09-21 DIAGNOSIS — M25.562 LEFT MEDIAL KNEE PAIN: Primary | ICD-10-CM

## 2022-09-21 PROCEDURE — 97112 NEUROMUSCULAR REEDUCATION: CPT

## 2022-09-21 PROCEDURE — 97110 THERAPEUTIC EXERCISES: CPT

## 2022-09-21 PROCEDURE — 97530 THERAPEUTIC ACTIVITIES: CPT

## 2022-09-21 NOTE — PROGRESS NOTES
Physical Therapy Daily Treatment Note     Name: Chi Carr  Clinic Number: [id number]    Therapy Diagnosis:   Encounter Diagnosis   Name Primary?    Left medial knee pain Yes     Physician: Sharon Cosme MD    Visit Date: 9/21/2022     Physician Orders: PT Eval and Treat   Medical Diagnosis from Referral: Chronic pain of both knees [M25.561, M25.562, G89.29]  Evaluation Date: 9/1/2022  Authorization Period Expiration: 9/1/2022 - 12/31/2022  Plan of Care Expiration: 10/27/22  Visit # / Visits authorized: 1/ 1 3/20    FOTO 1st:  FOTO 5th:  FOTO 10th:     Time In: 0800  Time Out: 0900  Total Billable Time: 60 minutes    Precautions: Standard    Subjective     Pt reports: that he drove to the wedding on Saturday with decrease pain in his low-back. States he hasn't had knee pain in a while >7 days.     He was compliant with home exercise program.  Response to previous treatment: decrease pain  Functional change: progressing    Pain: 1/10  Location: bilateral ant knee      Objective     9/21/22  Knee Range of Motion:    Right  Left    Active 10-0-130 10-0-130   Passive 10-0-130 10-0-130      Hip Range of Motion  Flexon : Right= 95 Left = 100  IR: Right = 25 Left= 25  ER: Right = 30 Left= 30      Yasmany test  Right=  post fossa 5'' from table 90 deg knee flexion  Left = post fossa 4'' form table and 75 deg knee flexion     9/13/22  - tender to superficial palpation over L3 and L4 SP along with paraspinals between L3-4 region with reproduction of his/this pain with slump testing       Casimiro received therapeutic exercises to develop for 12 minutes including:  Pt edu on form   assessments  Prone quad stretch 5x 30'' ea leg  Multisegmental flexion x10  Multisegmental extension x10     Not today  Slump nerve sliders 2x 15   SL hip ER 2x 8 ea side       Casimiro received the following manual therapy techniques: for 00 minutes, including:    Not today  Inf/post FA jt mobs Right hip   Distraction   Gr 5 lumbar roll mob  L4-5    Casimiro participated in neuromuscular re-education activities to improve: for 18 minutes. The following activities were included:  Patient education saggitall plane and front plane pelvic control  with side plank   SL clams with post tilt and lateral pelvic lift 2x5 ea leg  Qaudruped UE/LE un weighting 3x 10 ea side   Side plank 3x 30''      Not today  SL stance mini squat 2x 30'' ea LE  SL stance mini squat with hip ABD 2x 10  SL stance mini squat form with stance leg hip ER 4x5 ea LE     Casimiro participated in dynamic functional therapeutic activities to improve functional performance for 27 minutes, including:  Pt edu on activity modification in gardening  Squat/kneeling positioning from gardening  Hip hinge re-training  Lifting re-training  Lifting 15# from 12'' box single arm alt. 3x5 ea way      Not today  Paloff press 2x10 ea way   Hip hinge with shoulder ext 3x10   Hip hinge at cable column 3x10 7#  Hip hinge with orange sports cord  2x 10      Casimiro participated in gait training to improve functional mobility and safety for 00  minutes, including:      Home Exercises Provided and Patient Education Provided     Education provided:   - form and glute activation     Written Home Exercises Provided: Patient instructed to cont prior HEP.  Exercises were reviewed and Casimiro was able to demonstrate them prior to the end of the session.  Casimiro demonstrated good  understanding of the education provided.     See EMR under Patient Instructions for exercises provided prior visit.    Assessment   Pt presents with improved Range of Motion and improved frequency/intensity of knee pain/ neural sx/s. Treatment progresses with improving lumbar pelvic disassociation and core stability. Pt demonstrates increase lumbar flexion with lifting pattern this improves with cueing from PT to improve hip hinge. Patient education on different kneeling/bending/stooping for gardening. Pt edu on core brace and lifting pattern with bags  of lam        Casimiro Is progressing well towards his goals.   Pt prognosis is Excellent.     Pt will continue to benefit from skilled outpatient physical therapy to address the deficits listed in the problem list box on initial evaluation, provide pt/family education and to maximize pt's level of independence in the home and community environment.     Pt's spiritual, cultural and educational needs considered and pt agreeable to plan of care and goals.    Anticipated barriers to physical therapy: none    Goals:  Short Term Goals: 2-4 weeks   Pt will be able to demonstrate 5 deg and .5 inch decrease in melissa test of RLE.  Pt will be able to demonstrate 2x10 bridges with proper form.  Pt will be able to demonstrate > 5 deg increase in hip Range of Motion RLE.  Pt will be able to demonstrate walking .25 mile with no pain.     Long Term Goals: 4-8 weeks   Pt will be able to demonstrate BW squat x10 with equal WB.   Pt will be able to demonstrate walking >1 mile with no pain.  Pt will subjectively report decrease in pain with ADLs.      Plan     Improve hip mobility and strengthen with new Range of Motion to promote functional carryover    Page Alarcon, PT, DPT

## 2022-09-26 ENCOUNTER — CLINICAL SUPPORT (OUTPATIENT)
Dept: REHABILITATION | Facility: HOSPITAL | Age: 47
End: 2022-09-26
Attending: ORTHOPAEDIC SURGERY
Payer: COMMERCIAL

## 2022-09-26 DIAGNOSIS — M25.562 LEFT MEDIAL KNEE PAIN: Primary | ICD-10-CM

## 2022-09-26 PROCEDURE — 97530 THERAPEUTIC ACTIVITIES: CPT

## 2022-09-26 PROCEDURE — 97112 NEUROMUSCULAR REEDUCATION: CPT

## 2022-09-26 NOTE — PROGRESS NOTES
Physical Therapy Daily Treatment Note     Name: Chi Carr  Clinic Number: [id number]    Therapy Diagnosis:   Encounter Diagnosis   Name Primary?    Left medial knee pain Yes       Physician: Sharon Cosme MD    Visit Date: 9/26/2022     Physician Orders: PT Eval and Treat   Medical Diagnosis from Referral: Chronic pain of both knees [M25.561, M25.562, G89.29]  Evaluation Date: 9/1/2022  Authorization Period Expiration: 9/1/2022 - 12/31/2022  Plan of Care Expiration: 10/27/22  Visit # / Visits authorized: 5/20    FOTO 1st:  FOTO 5th:  FOTO 10th:     Time In: 0545 pm  Time Out: 0640 pm  Total Billable Time: 58 minutes (3 TA, 1 NMR)    Precautions: Standard    Subjective     Pt reports: his back was a little more sore over the weekend after extensive weed-eating but has hasn't been having the pain in his knees or down the back of his legs    He was compliant with home exercise program.  Response to previous treatment: decrease pain  Functional change: progressing    Pain: 1/10  Location: bilateral ant knee      Objective     9/26/22  Slump:  B (-)    9/21/22  Knee Range of Motion:    Right  Left    Active 10-0-130 10-0-130   Passive 10-0-130 10-0-130      Hip Range of Motion  Flexon : Right= 95 Left = 100  IR: Right = 25 Left= 25  ER: Right = 30 Left= 30      Yasmany test  Right=  post fossa 5'' from table 90 deg knee flexion  Left = post fossa 4'' form table and 75 deg knee flexion     9/13/22  - tender to superficial palpation over L3 and L4 SP along with paraspinals between L3-4 region with reproduction of his/this pain with slump testing       Casimiro received therapeutic exercises to develop for 00 minutes including:  Pt edu on form   assessments  Prone quad stretch 5x 30'' ea leg  Multisegmental flexion x10  Multisegmental extension x10     Not today  Slump nerve sliders 2x 15   SL hip ER 2x 8 ea side       Casimiro received the following manual therapy techniques: for 00 minutes, including:    Not  "today  Inf/post FA jt mobs Right hip   Distraction   Gr 5 lumbar roll mob L4-5    Casimiro participated in neuromuscular re-education activities to improve: for 10 minutes. The following activities were included:  Patient education lumbopelvic regional interdependence  Bridges; 3 x 10 w/ 5" hold      Not today  SL clams with post tilt and lateral pelvic lift 2x5 ea leg  Qaudruped UE/LE un weighting 3x 10 ea side   Side plank 3x 30''  SL stance mini squat 2x 30'' ea LE  SL stance mini squat with hip ABD 2x 10  SL stance mini squat form with stance leg hip ER 4x5 ea LE     Casimiro participated in dynamic functional therapeutic activities to improve functional performance for 48 minutes, including:  Pt edu on activity modification in gardening  Hip hinge re-training  Tall kneeling hip hinge positioning from gardening; 3 x 1 min   Squat re-training  Box squats 3 x 12  Hip hinge pallof press w/ rotation; 5#; 3 x 8         Not today  Lifting 15# from 12'' box single arm alt. 3x5 ea way  Paloff press 2x10 ea way   Hip hinge with shoulder ext 3x10   Hip hinge at cable column 3x10 7#  Hip hinge with orange sports cord  2x 10      Casimiro participated in gait training to improve functional mobility and safety for 00 minutes, including:      Home Exercises Provided and Patient Education Provided     Education provided:   - form and glute activation     Written Home Exercises Provided: Patient instructed to cont prior HEP.  Exercises were reviewed and Casimiro was able to demonstrate them prior to the end of the session.  Casimiro demonstrated good  understanding of the education provided.     See EMR under Patient Instructions for exercises provided prior visit.    Assessment   Pt has improved greatly in adverse neural tension but required extensive cuing and education on core activation vs valsalva. He was able to more appropriately perform a hip hinge motion in bridging, standing, and kneeling with subjective reports of his core and " glutes activating as opposed to his lumbar spinal erectors and hamstrings. He was educated on activity modification to incorporate these movements into his daily actions. He will benefit continued motor control training and abdominal and gluteal recruitment going forward.      Casimiro Is progressing well towards his goals.   Pt prognosis is Excellent.     Pt will continue to benefit from skilled outpatient physical therapy to address the deficits listed in the problem list box on initial evaluation, provide pt/family education and to maximize pt's level of independence in the home and community environment.     Pt's spiritual, cultural and educational needs considered and pt agreeable to plan of care and goals.    Anticipated barriers to physical therapy: none    Goals:  Short Term Goals: 2-4 weeks   Pt will be able to demonstrate 5 deg and .5 inch decrease in melissa test of RLE.  Pt will be able to demonstrate 2x10 bridges with proper form.  Pt will be able to demonstrate > 5 deg increase in hip Range of Motion RLE.  Pt will be able to demonstrate walking .25 mile with no pain.     Long Term Goals: 4-8 weeks   Pt will be able to demonstrate BW squat x10 with equal WB.   Pt will be able to demonstrate walking >1 mile with no pain.  Pt will subjectively report decrease in pain with ADLs.      Plan     Improve hip mobility and strengthen with new Range of Motion to promote functional carryover    Erin Richardson, PT, DPT

## 2022-09-28 ENCOUNTER — CLINICAL SUPPORT (OUTPATIENT)
Dept: REHABILITATION | Facility: HOSPITAL | Age: 47
End: 2022-09-28
Attending: ORTHOPAEDIC SURGERY
Payer: COMMERCIAL

## 2022-09-28 DIAGNOSIS — M25.562 LEFT MEDIAL KNEE PAIN: Primary | ICD-10-CM

## 2022-09-28 PROCEDURE — 97530 THERAPEUTIC ACTIVITIES: CPT

## 2022-09-28 PROCEDURE — 97112 NEUROMUSCULAR REEDUCATION: CPT

## 2022-09-28 NOTE — PROGRESS NOTES
Physical Therapy Daily Treatment Note     Name: Chi Carr  Clinic Number: [id number]    Therapy Diagnosis:   Encounter Diagnosis   Name Primary?    Left medial knee pain Yes     Physician: Sharon Cosme MD    Visit Date: 9/28/2022     Physician Orders: PT Eval and Treat   Medical Diagnosis from Referral: Chronic pain of both knees [M25.561, M25.562, G89.29]  Evaluation Date: 9/1/2022  Authorization Period Expiration: 9/1/2022 - 12/31/2022  Plan of Care Expiration: 10/27/22  Visit # / Visits authorized: 6/20    FOTO 5th: done 9/28/22 (6th visit)  FOTO 10th:     Time In: 0800 am  Time Out: 0857 am  Total Billable Time: 57 minutes (3 TA, 1 NMR)    Precautions: Standard    Subjective     Pt reports: he feels about 50% improved from initial eval at both his low back and his knees    He was compliant with home exercise program.  Response to previous treatment: decrease pain  Functional change: progressing    Pain: 1/10  Location: bilateral ant knee      Objective     9/26/22  Slump:  B (-)    9/21/22  Knee Range of Motion:    Right  Left    Active 10-0-130 10-0-130   Passive 10-0-130 10-0-130      Hip Range of Motion  Flexon : Right= 95 Left = 100  IR: Right = 25 Left= 25  ER: Right = 30 Left= 30      Yasmany test  Right=  post fossa 5'' from table 90 deg knee flexion  Left = post fossa 4'' form table and 75 deg knee flexion     9/13/22  - tender to superficial palpation over L3 and L4 SP along with paraspinals between L3-4 region with reproduction of his/this pain with slump testing       Casimiro received therapeutic exercises to develop for 00 minutes including:  Pt edu on form   assessments  Prone quad stretch 5x 30'' ea leg  Multisegmental flexion x10  Multisegmental extension x10     Not today  Slump nerve sliders 2x 15   SL hip ER 2x 8 ea side       Casimiro received the following manual therapy techniques: for 00 minutes, including:    Not today  Inf/post FA jt mobs Right hip   Distraction   Gr 5 lumbar roll mob  "L4-5    Casimiro participated in neuromuscular re-education activities to improve: for 12 minutes. The following activities were included:  Patient education lumbopelvic regional interdependence  Bridges; 3 x 10 w/ 5" hold  SL hip adb; 2 x 10 ea  Lateral oblique lean; 15#, 2 x 10      Not today  SL clams with post tilt and lateral pelvic lift 2x5 ea leg  Qaudruped UE/LE un weighting 3x 10 ea side   Side plank 3x 30''  SL stance mini squat 2x 30'' ea LE  SL stance mini squat with hip ABD 2x 10  SL stance mini squat form with stance leg hip ER 4x5 ea LE     Casimiro participated in dynamic functional therapeutic activities to improve functional performance for 45 minutes, including:  Pt edu on activity modification in gardening  Hip hinge re-training  Squat re-training  Box squats 3 x 12; 10#  Hip hinge pallof press w/ rotation; 10#; 3 x 8   Landmine functional squat; 25#; 3 x 10  's carry; 15#; 25 ft ea      Not today  Tall kneeling hip hinge positioning from gardening; 3 x 1 min   Lifting 15# from 12'' box single arm alt. 3x5 ea way  Paloff press 2x10 ea way   Hip hinge with shoulder ext 3x10   Hip hinge at cable column 3x10 7#  Hip hinge with orange sports cord  2x 10      Casimiro participated in gait training to improve functional mobility and safety for 00 minutes, including:      Home Exercises Provided and Patient Education Provided     Education provided:   - form and glute activation     Written Home Exercises Provided: Patient instructed to cont prior HEP.  Exercises were reviewed and Casimiro was able to demonstrate them prior to the end of the session.  Casimiro demonstrated good  understanding of the education provided.     See EMR under Patient Instructions for exercises provided prior visit.    Assessment   Casimiro showed good carry over in technique for squat and hip hinge. He tolerated increased loading without exacerbation of symptoms. Added  carries and oblique exercises for unilateral gardening tasks " and increased rotational stability. He will benefit continuation of this with possible tapering of upcoming session frequency with transitioning to HEP and gym routine maintenance.     Casimiro Is progressing well towards his goals.   Pt prognosis is Excellent.     Pt will continue to benefit from skilled outpatient physical therapy to address the deficits listed in the problem list box on initial evaluation, provide pt/family education and to maximize pt's level of independence in the home and community environment.     Pt's spiritual, cultural and educational needs considered and pt agreeable to plan of care and goals.    Anticipated barriers to physical therapy: none    Goals:  Short Term Goals: 2-4 weeks   Pt will be able to demonstrate 5 deg and .5 inch decrease in melissa test of RLE.  Pt will be able to demonstrate 2x10 bridges with proper form.  Pt will be able to demonstrate > 5 deg increase in hip Range of Motion RLE.  Pt will be able to demonstrate walking .25 mile with no pain.     Long Term Goals: 4-8 weeks   Pt will be able to demonstrate BW squat x10 with equal WB.   Pt will be able to demonstrate walking >1 mile with no pain.  Pt will subjectively report decrease in pain with ADLs.      Plan     Improve hip mobility and strengthen with new Range of Motion to promote functional carryover    Erin Richardson, PT, DPT

## 2022-10-20 DIAGNOSIS — Z12.11 COLON CANCER SCREENING: Primary | ICD-10-CM

## 2022-11-11 ENCOUNTER — PATIENT MESSAGE (OUTPATIENT)
Dept: INTERNAL MEDICINE | Facility: CLINIC | Age: 47
End: 2022-11-11
Payer: COMMERCIAL

## 2023-01-26 DIAGNOSIS — F98.8 ATTENTION DEFICIT DISORDER, UNSPECIFIED HYPERACTIVITY PRESENCE: ICD-10-CM

## 2023-01-26 RX ORDER — METHYLPHENIDATE HYDROCHLORIDE 20 MG/1
20 CAPSULE, EXTENDED RELEASE ORAL DAILY
Qty: 30 CAPSULE | Refills: 0 | Status: SHIPPED | OUTPATIENT
Start: 2023-01-26 | End: 2023-03-16 | Stop reason: SDUPTHER

## 2023-01-26 NOTE — TELEPHONE ENCOUNTER
No new care gaps identified.  [facility] Embedded Care Gaps. Reference number: [id number]. 1/26/2023   7:19:35 AM CST

## 2023-03-16 ENCOUNTER — TELEPHONE (OUTPATIENT)
Dept: ENDOSCOPY | Facility: HOSPITAL | Age: 48
End: 2023-03-16
Payer: COMMERCIAL

## 2023-06-12 ENCOUNTER — PATIENT OUTREACH (OUTPATIENT)
Dept: ADMINISTRATIVE | Facility: HOSPITAL | Age: 48
End: 2023-06-12
Payer: COMMERCIAL

## 2023-06-12 NOTE — PROGRESS NOTES
Health Maintenance Due   Topic Date Due    Hepatitis C Screening  Never done    HIV Screening  Never done    TETANUS VACCINE  Never done    Colorectal Cancer Screening  Never done    COVID-19 Vaccine (3 - Pfizer series) 11/20/2021     Chart reviewed.   Immunizations: Reconciled  Orders placed: N/A  Upcoming appts to satisfy EVA topics: N/A

## 2023-06-23 ENCOUNTER — LAB VISIT (OUTPATIENT)
Dept: LAB | Facility: HOSPITAL | Age: 48
End: 2023-06-23
Payer: COMMERCIAL

## 2023-06-23 ENCOUNTER — OFFICE VISIT (OUTPATIENT)
Dept: INTERNAL MEDICINE | Facility: CLINIC | Age: 48
End: 2023-06-23
Payer: COMMERCIAL

## 2023-06-23 VITALS
WEIGHT: 277.75 LBS | BODY MASS INDEX: 41.14 KG/M2 | SYSTOLIC BLOOD PRESSURE: 134 MMHG | RESPIRATION RATE: 12 BRPM | HEART RATE: 60 BPM | TEMPERATURE: 98 F | DIASTOLIC BLOOD PRESSURE: 70 MMHG | HEIGHT: 69 IN

## 2023-06-23 DIAGNOSIS — Z00.00 ANNUAL PHYSICAL EXAM: Primary | ICD-10-CM

## 2023-06-23 DIAGNOSIS — I10 PRIMARY HYPERTENSION: ICD-10-CM

## 2023-06-23 DIAGNOSIS — E66.01 OBESITY, CLASS III, BMI 40-49.9 (MORBID OBESITY): ICD-10-CM

## 2023-06-23 DIAGNOSIS — Z12.11 COLON CANCER SCREENING: ICD-10-CM

## 2023-06-23 DIAGNOSIS — Z00.00 ANNUAL PHYSICAL EXAM: ICD-10-CM

## 2023-06-23 DIAGNOSIS — F98.8 ATTENTION DEFICIT DISORDER, UNSPECIFIED HYPERACTIVITY PRESENCE: ICD-10-CM

## 2023-06-23 LAB
ALBUMIN SERPL BCP-MCNC: 4.2 G/DL (ref 3.5–5.2)
ALP SERPL-CCNC: 67 U/L (ref 55–135)
ALT SERPL W/O P-5'-P-CCNC: 23 U/L (ref 10–44)
AMORPH CRY UR QL COMP ASSIST: ABNORMAL
ANION GAP SERPL CALC-SCNC: 10 MMOL/L (ref 8–16)
AST SERPL-CCNC: 16 U/L (ref 10–40)
BASOPHILS # BLD AUTO: 0.05 K/UL (ref 0–0.2)
BASOPHILS NFR BLD: 0.5 % (ref 0–1.9)
BILIRUB SERPL-MCNC: 0.5 MG/DL (ref 0.1–1)
BILIRUB UR QL STRIP: NEGATIVE
BUN SERPL-MCNC: 14 MG/DL (ref 6–20)
CALCIUM SERPL-MCNC: 9.4 MG/DL (ref 8.7–10.5)
CHLORIDE SERPL-SCNC: 105 MMOL/L (ref 95–110)
CHOLEST SERPL-MCNC: 249 MG/DL (ref 120–199)
CHOLEST/HDLC SERPL: 5.7 {RATIO} (ref 2–5)
CLARITY UR REFRACT.AUTO: ABNORMAL
CO2 SERPL-SCNC: 26 MMOL/L (ref 23–29)
COLOR UR AUTO: YELLOW
CREAT SERPL-MCNC: 1 MG/DL (ref 0.5–1.4)
DIFFERENTIAL METHOD: NORMAL
EOSINOPHIL # BLD AUTO: 0.3 K/UL (ref 0–0.5)
EOSINOPHIL NFR BLD: 3.5 % (ref 0–8)
ERYTHROCYTE [DISTWIDTH] IN BLOOD BY AUTOMATED COUNT: 12.7 % (ref 11.5–14.5)
EST. GFR  (NO RACE VARIABLE): >60 ML/MIN/1.73 M^2
ESTIMATED AVG GLUCOSE: 100 MG/DL (ref 68–131)
GLUCOSE SERPL-MCNC: 88 MG/DL (ref 70–110)
GLUCOSE UR QL STRIP: NEGATIVE
HBA1C MFR BLD: 5.1 % (ref 4–5.6)
HCT VFR BLD AUTO: 46 % (ref 40–54)
HDLC SERPL-MCNC: 44 MG/DL (ref 40–75)
HDLC SERPL: 17.7 % (ref 20–50)
HGB BLD-MCNC: 15.6 G/DL (ref 14–18)
HGB UR QL STRIP: NEGATIVE
IMM GRANULOCYTES # BLD AUTO: 0.03 K/UL (ref 0–0.04)
IMM GRANULOCYTES NFR BLD AUTO: 0.3 % (ref 0–0.5)
KETONES UR QL STRIP: NEGATIVE
LDLC SERPL CALC-MCNC: 175.2 MG/DL (ref 63–159)
LEUKOCYTE ESTERASE UR QL STRIP: NEGATIVE
LYMPHOCYTES # BLD AUTO: 3.2 K/UL (ref 1–4.8)
LYMPHOCYTES NFR BLD: 34.2 % (ref 18–48)
MCH RBC QN AUTO: 30.4 PG (ref 27–31)
MCHC RBC AUTO-ENTMCNC: 33.9 G/DL (ref 32–36)
MCV RBC AUTO: 90 FL (ref 82–98)
MICROSCOPIC COMMENT: ABNORMAL
MONOCYTES # BLD AUTO: 0.5 K/UL (ref 0.3–1)
MONOCYTES NFR BLD: 5.2 % (ref 4–15)
NEUTROPHILS # BLD AUTO: 5.2 K/UL (ref 1.8–7.7)
NEUTROPHILS NFR BLD: 56.3 % (ref 38–73)
NITRITE UR QL STRIP: NEGATIVE
NONHDLC SERPL-MCNC: 205 MG/DL
NRBC BLD-RTO: 0 /100 WBC
PH UR STRIP: 5 [PH] (ref 5–8)
PLATELET # BLD AUTO: 213 K/UL (ref 150–450)
PMV BLD AUTO: 10.2 FL (ref 9.2–12.9)
POTASSIUM SERPL-SCNC: 4.8 MMOL/L (ref 3.5–5.1)
PROT SERPL-MCNC: 7.4 G/DL (ref 6–8.4)
PROT UR QL STRIP: NEGATIVE
RBC # BLD AUTO: 5.13 M/UL (ref 4.6–6.2)
RBC #/AREA URNS AUTO: 10 /HPF (ref 0–4)
SODIUM SERPL-SCNC: 141 MMOL/L (ref 136–145)
SP GR UR STRIP: 1.03 (ref 1–1.03)
TRIGL SERPL-MCNC: 149 MG/DL (ref 30–150)
TSH SERPL DL<=0.005 MIU/L-ACNC: 1.1 UIU/ML (ref 0.4–4)
URN SPEC COLLECT METH UR: ABNORMAL
WBC # BLD AUTO: 9.23 K/UL (ref 3.9–12.7)

## 2023-06-23 PROCEDURE — 99999 PR PBB SHADOW E&M-EST. PATIENT-LVL III: ICD-10-PCS | Mod: PBBFAC,,, | Performed by: INTERNAL MEDICINE

## 2023-06-23 PROCEDURE — 3078F PR MOST RECENT DIASTOLIC BLOOD PRESSURE < 80 MM HG: ICD-10-PCS | Mod: CPTII,S$GLB,, | Performed by: INTERNAL MEDICINE

## 2023-06-23 PROCEDURE — 1159F PR MEDICATION LIST DOCUMENTED IN MEDICAL RECORD: ICD-10-PCS | Mod: CPTII,S$GLB,, | Performed by: INTERNAL MEDICINE

## 2023-06-23 PROCEDURE — 1160F RVW MEDS BY RX/DR IN RCRD: CPT | Mod: CPTII,S$GLB,, | Performed by: INTERNAL MEDICINE

## 2023-06-23 PROCEDURE — 80061 LIPID PANEL: CPT | Performed by: INTERNAL MEDICINE

## 2023-06-23 PROCEDURE — 84443 ASSAY THYROID STIM HORMONE: CPT | Performed by: INTERNAL MEDICINE

## 2023-06-23 PROCEDURE — 80053 COMPREHEN METABOLIC PANEL: CPT | Performed by: INTERNAL MEDICINE

## 2023-06-23 PROCEDURE — 84153 ASSAY OF PSA TOTAL: CPT | Performed by: INTERNAL MEDICINE

## 2023-06-23 PROCEDURE — 3078F DIAST BP <80 MM HG: CPT | Mod: CPTII,S$GLB,, | Performed by: INTERNAL MEDICINE

## 2023-06-23 PROCEDURE — 99999 PR PBB SHADOW E&M-EST. PATIENT-LVL III: CPT | Mod: PBBFAC,,, | Performed by: INTERNAL MEDICINE

## 2023-06-23 PROCEDURE — 81001 URINALYSIS AUTO W/SCOPE: CPT | Performed by: INTERNAL MEDICINE

## 2023-06-23 PROCEDURE — 3075F PR MOST RECENT SYSTOLIC BLOOD PRESS GE 130-139MM HG: ICD-10-PCS | Mod: CPTII,S$GLB,, | Performed by: INTERNAL MEDICINE

## 2023-06-23 PROCEDURE — 3008F PR BODY MASS INDEX (BMI) DOCUMENTED: ICD-10-PCS | Mod: CPTII,S$GLB,, | Performed by: INTERNAL MEDICINE

## 2023-06-23 PROCEDURE — 36415 COLL VENOUS BLD VENIPUNCTURE: CPT | Mod: PO | Performed by: INTERNAL MEDICINE

## 2023-06-23 PROCEDURE — 4010F PR ACE/ARB THEARPY RXD/TAKEN: ICD-10-PCS | Mod: CPTII,S$GLB,, | Performed by: INTERNAL MEDICINE

## 2023-06-23 PROCEDURE — 3075F SYST BP GE 130 - 139MM HG: CPT | Mod: CPTII,S$GLB,, | Performed by: INTERNAL MEDICINE

## 2023-06-23 PROCEDURE — 1160F PR REVIEW ALL MEDS BY PRESCRIBER/CLIN PHARMACIST DOCUMENTED: ICD-10-PCS | Mod: CPTII,S$GLB,, | Performed by: INTERNAL MEDICINE

## 2023-06-23 PROCEDURE — 85025 COMPLETE CBC W/AUTO DIFF WBC: CPT | Performed by: INTERNAL MEDICINE

## 2023-06-23 PROCEDURE — 99396 PREV VISIT EST AGE 40-64: CPT | Mod: S$GLB,,, | Performed by: INTERNAL MEDICINE

## 2023-06-23 PROCEDURE — 83036 HEMOGLOBIN GLYCOSYLATED A1C: CPT | Performed by: INTERNAL MEDICINE

## 2023-06-23 PROCEDURE — 4010F ACE/ARB THERAPY RXD/TAKEN: CPT | Mod: CPTII,S$GLB,, | Performed by: INTERNAL MEDICINE

## 2023-06-23 PROCEDURE — 1159F MED LIST DOCD IN RCRD: CPT | Mod: CPTII,S$GLB,, | Performed by: INTERNAL MEDICINE

## 2023-06-23 PROCEDURE — 99396 PR PREVENTIVE VISIT,EST,40-64: ICD-10-PCS | Mod: S$GLB,,, | Performed by: INTERNAL MEDICINE

## 2023-06-23 PROCEDURE — 3008F BODY MASS INDEX DOCD: CPT | Mod: CPTII,S$GLB,, | Performed by: INTERNAL MEDICINE

## 2023-06-23 RX ORDER — TRAZODONE HYDROCHLORIDE 50 MG/1
1 TABLET ORAL NIGHTLY
COMMUNITY
Start: 2022-07-14 | End: 2023-07-24 | Stop reason: SDUPTHER

## 2023-06-23 NOTE — PROGRESS NOTES
Subjective     Patient ID: Chi Carr is a 48 y.o. male.    Chief Complaint: Annual Exam    HPI  48 y.o. Male here for annual exam.      Vaccines: Influenza (declined); Tetanus (will consider)  Eye exam: current         Colonoscopy: needs     Past Medical History:  No date: ADHD (attention deficit hyperactivity disorder)  No date: Hypertension  No date: Morbid obesity with BMI of 40.0-44.9, adult  No date: Other insomnia  No date: Prediabetes  History reviewed. No pertinent surgical history.  Social History    Socioeconomic History      Marital status:       Number of children: 1    Tobacco Use      Smoking status: Never Smoker      Smokeless tobacco: Never Used    Substance and Sexual Activity      Alcohol use: Yes        Comment: social      Drug use: Not Currently      Sexual activity: Yes        Partners: Female    Social History Narrative            Review of patient's allergies indicates:  No Known Allergies  Review of Systems   Constitutional:  Negative for activity change, appetite change, chills, diaphoresis, fatigue, fever and unexpected weight change.   HENT:  Negative for nasal congestion, hearing loss, mouth sores, postnasal drip, rhinorrhea, sinus pressure/congestion, sneezing, sore throat, trouble swallowing and voice change.    Eyes:  Negative for discharge, itching and visual disturbance.   Respiratory:  Negative for cough, chest tightness, shortness of breath and wheezing.    Cardiovascular:  Negative for chest pain, palpitations and leg swelling.   Gastrointestinal:  Negative for abdominal pain, blood in stool, constipation, diarrhea, nausea and vomiting.   Endocrine: Negative for cold intolerance, heat intolerance, polydipsia and polyuria.   Genitourinary:  Negative for difficulty urinating, dysuria, flank pain, hematuria and urgency.   Musculoskeletal:  Negative for arthralgias, back pain, joint swelling, myalgias and neck pain.   Integumentary:  Negative for rash and  wound.   Allergic/Immunologic: Negative for environmental allergies and food allergies.   Neurological:  Negative for dizziness, tremors, seizures, syncope, weakness and headaches.   Hematological:  Negative for adenopathy. Does not bruise/bleed easily.   Psychiatric/Behavioral:  Negative for confusion, dysphoric mood, sleep disturbance and suicidal ideas. The patient is not nervous/anxious.         Objective     Physical Exam  Constitutional:       General: He is not in acute distress.     Appearance: Normal appearance. He is well-developed. He is not ill-appearing, toxic-appearing or diaphoretic.   HENT:      Head: Normocephalic and atraumatic.      Right Ear: External ear normal.      Left Ear: External ear normal.      Nose: Nose normal.      Mouth/Throat:      Pharynx: No oropharyngeal exudate.   Eyes:      General: No scleral icterus.        Right eye: No discharge.         Left eye: No discharge.      Extraocular Movements: Extraocular movements intact.      Conjunctiva/sclera: Conjunctivae normal.      Pupils: Pupils are equal, round, and reactive to light.   Neck:      Thyroid: No thyromegaly.      Vascular: No JVD.   Cardiovascular:      Rate and Rhythm: Normal rate and regular rhythm.      Pulses: Normal pulses.      Heart sounds: Normal heart sounds. No murmur heard.  Pulmonary:      Effort: Pulmonary effort is normal. No respiratory distress.      Breath sounds: Normal breath sounds. No wheezing or rales.   Abdominal:      General: Bowel sounds are normal. There is no distension.      Palpations: Abdomen is soft.      Tenderness: There is no abdominal tenderness. There is no right CVA tenderness, left CVA tenderness, guarding or rebound.   Musculoskeletal:      Cervical back: Normal range of motion and neck supple. No rigidity.      Right lower leg: No edema.      Left lower leg: No edema.   Lymphadenopathy:      Cervical: No cervical adenopathy.   Skin:     General: Skin is warm and dry.      Capillary  Refill: Capillary refill takes less than 2 seconds.      Coloration: Skin is not pale.      Findings: No rash.   Neurological:      General: No focal deficit present.      Mental Status: He is alert and oriented to person, place, and time. Mental status is at baseline.      Cranial Nerves: No cranial nerve deficit.      Sensory: No sensory deficit.      Motor: No weakness.      Coordination: Coordination normal.      Gait: Gait normal.      Deep Tendon Reflexes: Reflexes normal.   Psychiatric:         Mood and Affect: Mood normal.         Behavior: Behavior normal.         Thought Content: Thought content normal.         Judgment: Judgment normal.          Assessment and Plan     1. Annual physical exam  -     CBC Auto Differential; Future; Expected date: 06/23/2023  -     Comprehensive Metabolic Panel; Future; Expected date: 06/23/2023  -     TSH; Future; Expected date: 06/23/2023  -     Lipid Panel; Future; Expected date: 06/23/2023  -     Urinalysis; Future  -     PSA, Screening; Future; Expected date: 06/23/2023  -     Hemoglobin A1C; Future; Expected date: 06/23/2023    2. Obesity, Class III, BMI 40-49.9 (morbid obesity)    3. Attention deficit disorder, unspecified hyperactivity presence    4. Primary hypertension    5. Colon cancer screening  -     Ambulatory referral/consult to Endo Procedure ; Future; Expected date: 06/24/2023         Blood work ordered      Morbid Obesity- proper diet/exercise stressed      HTN- stable on Prinzide 10-12.5 mg qd      Chronic B/L knee pain- stable      ADD- stable on Ritalin PRN      F/u in 1 yr

## 2023-06-24 LAB — COMPLEXED PSA SERPL-MCNC: 0.32 NG/ML (ref 0–4)

## 2023-07-06 ENCOUNTER — TELEPHONE (OUTPATIENT)
Dept: INTERNAL MEDICINE | Facility: CLINIC | Age: 48
End: 2023-07-06

## 2023-07-06 DIAGNOSIS — R31.9 HEMATURIA, UNSPECIFIED TYPE: Primary | ICD-10-CM

## 2023-07-13 ENCOUNTER — LAB VISIT (OUTPATIENT)
Dept: LAB | Facility: HOSPITAL | Age: 48
End: 2023-07-13
Payer: COMMERCIAL

## 2023-07-13 DIAGNOSIS — R31.9 HEMATURIA, UNSPECIFIED TYPE: ICD-10-CM

## 2023-07-13 LAB
BILIRUB UR QL STRIP: NEGATIVE
CLARITY UR REFRACT.AUTO: CLEAR
COLOR UR AUTO: YELLOW
GLUCOSE UR QL STRIP: NEGATIVE
HGB UR QL STRIP: NEGATIVE
KETONES UR QL STRIP: NEGATIVE
LEUKOCYTE ESTERASE UR QL STRIP: NEGATIVE
NITRITE UR QL STRIP: NEGATIVE
PH UR STRIP: 5 [PH] (ref 5–8)
PROT UR QL STRIP: NEGATIVE
SP GR UR STRIP: 1.02 (ref 1–1.03)
URN SPEC COLLECT METH UR: NORMAL

## 2023-07-13 PROCEDURE — 81003 URINALYSIS AUTO W/O SCOPE: CPT | Performed by: INTERNAL MEDICINE

## 2023-07-13 PROCEDURE — 87086 URINE CULTURE/COLONY COUNT: CPT | Performed by: INTERNAL MEDICINE

## 2023-07-14 LAB — BACTERIA UR CULT: NO GROWTH

## 2023-07-24 ENCOUNTER — PATIENT MESSAGE (OUTPATIENT)
Dept: INTERNAL MEDICINE | Facility: CLINIC | Age: 48
End: 2023-07-24
Payer: COMMERCIAL

## 2023-07-24 DIAGNOSIS — F98.8 ATTENTION DEFICIT DISORDER, UNSPECIFIED HYPERACTIVITY PRESENCE: ICD-10-CM

## 2023-07-24 RX ORDER — METHYLPHENIDATE HYDROCHLORIDE 20 MG/1
20 CAPSULE, EXTENDED RELEASE ORAL DAILY
Qty: 30 CAPSULE | Refills: 0 | Status: SHIPPED | OUTPATIENT
Start: 2023-07-24 | End: 2023-09-18 | Stop reason: SDUPTHER

## 2023-07-24 RX ORDER — TRAZODONE HYDROCHLORIDE 50 MG/1
50 TABLET ORAL NIGHTLY
Qty: 90 TABLET | Refills: 3 | Status: SHIPPED | OUTPATIENT
Start: 2023-07-24 | End: 2023-12-20 | Stop reason: SDUPTHER

## 2023-07-24 NOTE — TELEPHONE ENCOUNTER
No care due was identified.  [facility] [place] Embedded Care Due Messages. Reference number: [id number].   7/24/2023 8:54:18 AM CDT

## 2023-07-24 NOTE — TELEPHONE ENCOUNTER
No care due was identified.  [facility] Embedded Care Due Messages. Reference number: [id number].   7/24/2023 10:34:32 AM CDT

## 2023-07-26 ENCOUNTER — PATIENT MESSAGE (OUTPATIENT)
Dept: INTERNAL MEDICINE | Facility: CLINIC | Age: 48
End: 2023-07-26
Payer: COMMERCIAL

## 2023-08-17 ENCOUNTER — TELEPHONE (OUTPATIENT)
Dept: ENDOSCOPY | Facility: HOSPITAL | Age: 48
End: 2023-08-17

## 2023-08-17 ENCOUNTER — CLINICAL SUPPORT (OUTPATIENT)
Dept: ENDOSCOPY | Facility: HOSPITAL | Age: 48
End: 2023-08-17
Attending: INTERNAL MEDICINE
Payer: COMMERCIAL

## 2023-08-17 VITALS — BODY MASS INDEX: 41.02 KG/M2 | HEIGHT: 69 IN

## 2023-08-17 DIAGNOSIS — Z12.11 COLON CANCER SCREENING: ICD-10-CM

## 2023-08-17 RX ORDER — SODIUM, POTASSIUM,MAG SULFATES 17.5-3.13G
1 SOLUTION, RECONSTITUTED, ORAL ORAL DAILY
Qty: 1 KIT | Refills: 0 | Status: SHIPPED | OUTPATIENT
Start: 2023-08-17 | End: 2023-08-19

## 2023-08-17 NOTE — TELEPHONE ENCOUNTER
Spoke to patient to schedule procedure(s) Colonoscopy       Physician to perform procedure(s) Dr. DORIAN Hebert  Date of Procedure (s) 11/17/23  Arrival Time 7:30 AM  Time of Procedure(s) 8:30 AM   Location of Procedure(s) [place] 4th Floor  Type of Rx Prep sent to patient: Suprep  Instructions provided to patient via [facility]    Patient was informed on the following information and verbalized understanding. Screening questionnaire reviewed with patient and complete. If procedure requires anesthesia, a responsible adult needs to be present to accompany the patient home, patient cannot drive after receiving anesthesia. Appointment details are tentative, especially check-in time. Patient will receive a prep-op call 4 days prior to confirm check-in time for procedure. If applicable the patient should contact their pharmacy to verify Rx for procedure prep is ready for pick-up. Patient was advised to call the scheduling department at [phone] if pharmacy states no Rx is available. Patient was advised to call the endoscopy scheduling department if any questions or concerns arise.      [facility] Scheduling Department

## 2023-08-17 NOTE — PLAN OF CARE
Spoke to patient to schedule procedure(s) Colonoscopy       Physician to perform procedure(s) Dr. DORIAN Hebert  Date of Procedure (s) 11/17/23  Arrival Time 7:30 AM  Time of Procedure(s) 8:30 AM   Location of Procedure(s) [place] 4th Floor  Type of Rx Prep sent to patient: Suprep  Instructions provided to patient via [facility]    Patient was informed on the following information and verbalized understanding. Screening questionnaire reviewed with patient and complete. If procedure requires anesthesia, a responsible adult needs to be present to accompany the patient home, patient cannot drive after receiving anesthesia. Appointment details are tentative, especially check-in time. Patient will receive a prep-op call 4 days prior to confirm check-in time for procedure. If applicable the patient should contact their pharmacy to verify Rx for procedure prep is ready for pick-up. Patient was advised to call the scheduling department at [phone] if pharmacy states no Rx is available. Patient was advised to call the endoscopy scheduling department if any questions or concerns arise.      [facility] Scheduling Department

## 2023-11-10 ENCOUNTER — TELEPHONE (OUTPATIENT)
Dept: ENDOSCOPY | Facility: HOSPITAL | Age: 48
End: 2023-11-10
Payer: COMMERCIAL

## 2023-11-16 ENCOUNTER — PATIENT MESSAGE (OUTPATIENT)
Dept: ENDOSCOPY | Facility: HOSPITAL | Age: 48
End: 2023-11-16
Payer: COMMERCIAL

## 2023-11-17 ENCOUNTER — HOSPITAL ENCOUNTER (OUTPATIENT)
Facility: HOSPITAL | Age: 48
Discharge: HOME | End: 2023-11-17
Attending: COLON & RECTAL SURGERY | Admitting: COLON & RECTAL SURGERY
Payer: COMMERCIAL

## 2023-11-17 ENCOUNTER — ANESTHESIA (OUTPATIENT)
Dept: ENDOSCOPY | Facility: HOSPITAL | Age: 48
End: 2023-11-17
Payer: COMMERCIAL

## 2023-11-17 ENCOUNTER — ANESTHESIA EVENT (OUTPATIENT)
Dept: ENDOSCOPY | Facility: HOSPITAL | Age: 48
End: 2023-11-17
Payer: COMMERCIAL

## 2023-11-17 VITALS
BODY MASS INDEX: 39.55 KG/M2 | WEIGHT: 267 LBS | TEMPERATURE: 98 F | DIASTOLIC BLOOD PRESSURE: 80 MMHG | HEART RATE: 55 BPM | SYSTOLIC BLOOD PRESSURE: 134 MMHG | RESPIRATION RATE: 18 BRPM | OXYGEN SATURATION: 96 % | HEIGHT: 69 IN

## 2023-11-17 DIAGNOSIS — Z12.11 SCREEN FOR COLON CANCER: ICD-10-CM

## 2023-11-17 PROCEDURE — E9220 PRA ENDO ANESTHESIA: HCPCS | Mod: ,,, | Performed by: NURSE ANESTHETIST, CERTIFIED REGISTERED

## 2023-11-17 PROCEDURE — 25000003 PHARM REV CODE 250: Performed by: NURSE ANESTHETIST, CERTIFIED REGISTERED

## 2023-11-17 PROCEDURE — 37000008 HC ANESTHESIA 1ST 15 MINUTES: Performed by: COLON & RECTAL SURGERY

## 2023-11-17 PROCEDURE — G0121 COLON CA SCRN NOT HI RSK IND: HCPCS | Mod: ,,, | Performed by: COLON & RECTAL SURGERY

## 2023-11-17 PROCEDURE — G0121 COLON CA SCRN NOT HI RSK IND: ICD-10-PCS | Mod: ,,, | Performed by: COLON & RECTAL SURGERY

## 2023-11-17 PROCEDURE — E9220 PRA ENDO ANESTHESIA: ICD-10-PCS | Mod: ,,, | Performed by: NURSE ANESTHETIST, CERTIFIED REGISTERED

## 2023-11-17 PROCEDURE — 37000009 HC ANESTHESIA EA ADD 15 MINS: Performed by: COLON & RECTAL SURGERY

## 2023-11-17 PROCEDURE — 63600175 PHARM REV CODE 636 W HCPCS: Performed by: NURSE ANESTHETIST, CERTIFIED REGISTERED

## 2023-11-17 PROCEDURE — G0121 COLON CA SCRN NOT HI RSK IND: HCPCS | Performed by: COLON & RECTAL SURGERY

## 2023-11-17 RX ORDER — PROPOFOL 10 MG/ML
VIAL (ML) INTRAVENOUS CONTINUOUS PRN
Status: DISCONTINUED | OUTPATIENT
Start: 2023-11-17 | End: 2023-11-17

## 2023-11-17 RX ORDER — LIDOCAINE HYDROCHLORIDE 20 MG/ML
INJECTION INTRAVENOUS
Status: DISCONTINUED | OUTPATIENT
Start: 2023-11-17 | End: 2023-11-17

## 2023-11-17 RX ORDER — SODIUM CHLORIDE 9 MG/ML
INJECTION, SOLUTION INTRAVENOUS CONTINUOUS
Status: DISCONTINUED | OUTPATIENT
Start: 2023-11-17 | End: 2023-11-17 | Stop reason: HOSPADM

## 2023-11-17 RX ADMIN — LIDOCAINE HYDROCHLORIDE 50 MG: 20 INJECTION INTRAVENOUS at 09:11

## 2023-11-17 RX ADMIN — SODIUM CHLORIDE: 0.9 INJECTION, SOLUTION INTRAVENOUS at 09:11

## 2023-11-17 RX ADMIN — PROPOFOL 100 MG: 10 INJECTION, EMULSION INTRAVENOUS at 09:11

## 2023-11-17 RX ADMIN — PROPOFOL 150 MCG/KG/MIN: 10 INJECTION, EMULSION INTRAVENOUS at 09:11

## 2023-11-17 NOTE — ANESTHESIA PREPROCEDURE EVALUATION
11/17/2023  Chi Carr is a 48 y.o., male.      Pre-op Assessment    I have reviewed the Patient Summary Reports.    I have reviewed the NPO Status.   I have reviewed the Medications.     Review of Systems  Anesthesia Hx:  No problems with previous Anesthesia             Denies Family Hx of Anesthesia complications.    Denies Personal Hx of Anesthesia complications.                    Hematology/Oncology:  Hematology Normal   Oncology Normal                                   EENT/Dental:  EENT/Dental Normal           Cardiovascular:  Cardiovascular Normal Exercise tolerance: good                                           Renal/:  Renal/ Normal                 Hepatic/GI:  Hepatic/GI Normal                 Musculoskeletal:  Musculoskeletal Normal                Neurological:  Neurology Normal                                      Endocrine:  Endocrine Normal            Dermatological:  Skin Normal    Psych:  Psychiatric Normal                    Physical Exam  General: Well nourished, Cooperative, Alert and Oriented    Airway:  Mallampati: II   Mouth Opening: Normal  TM Distance: Normal  Neck ROM: Normal ROM    Dental:  Intact        Anesthesia Plan  Type of Anesthesia, risks & benefits discussed:    Anesthesia Type: Gen Natural Airway  Intra-op Monitoring Plan: Standard ASA Monitors  Post Op Pain Control Plan: multimodal analgesia  Induction:  IV  Informed Consent: Informed consent signed with the Patient and all parties understand the risks and agree with anesthesia plan.  All questions answered.   ASA Score: 3  Day of Surgery Review of History & Physical: H&P Update referred to the surgeon/provider.    Ready For Surgery From Anesthesia Perspective.     .

## 2023-11-17 NOTE — H&P
"COLONOSCOPY HISTORY AND PHYSICAL EXAM    Procedure : Colonoscopy      INDICATIONS: asymptomatic screening exam    Family Hx of CRC: denies    Last Colonoscopy:  never  Sedation Problems: NO  Fam Hx of Sedation Problems: NO     Past Medical History:   Diagnosis Date    ADHD (attention deficit hyperactivity disorder)     Hypertension     Morbid obesity with BMI of 40.0-44.9, adult     Other insomnia     Prediabetes      Family History   Problem Relation Age of Onset    Cancer Mother         sarcoma    Prostate cancer Father     Diabetes Maternal Grandmother     Heart disease Neg Hx     Stroke Neg Hx      Social History     Socioeconomic History    Marital status:     Number of children: 1   Tobacco Use    Smoking status: Never    Smokeless tobacco: Never   Substance and Sexual Activity    Alcohol use: Yes     Comment: social    Drug use: Not Currently    Sexual activity: Yes     Partners: Female   Social History Narrative    ** Merged History Encounter **                 Review of Systems - Negative except   Respiratory ROS: no dyspnea  Cardiovascular ROS: no exertional chest pain  Gastrointestinal ROS: NO abdominal discomfort,  NO rectal bleeding  Musculoskeletal ROS: no muscular pain  Neurological ROS: no recent stroke    Physical Exam:  BP (!) 153/94 (BP Location: Left arm, Patient Position: Lying)   Pulse 68   Temp 97.7 °F (36.5 °C)   Resp 16   Ht 5' 9" (1.753 m)   Wt 121.1 kg (267 lb)   SpO2 98%   BMI 39.43 kg/m²     General: Alert and oriented x 3. No acute distress. Well-nourished.  HEENT: EOMI. Sclera anicteric. Moist mucous membranes. No scleral icterus. No cervical lymphadenopathy.  Lungs: Clear to auscultation bilaterally. No accessory muscle use.  Cardiac: Regular rate and rhythm. No murmur. No JVD.  Abdomen: soft, non tender  Extremities: No edema. Non-tender.  Skin: No rashes or lesions. Warm.  Neuro: No focal neurological deficits. CN II-XII grossly intact, but not individually " tested.  Psych: Cooperative. Appropriate mood and affect.    ASA:  II    PLAN  COLONOSCOPY.    SedationPlan :MAC    The details of the procedure, the possible need for biopsy or polypectomy and the potential risks including bleeding, perforation, missed polyps were discussed in detail.    Ward Davey MD  Colon & Rectal Surgery Fellow

## 2023-11-17 NOTE — PLAN OF CARE
Discharge criteria met.  Discharge instructions and education provided.  Patient verbalized understanding.  Home with responsible adult.

## 2023-11-17 NOTE — PROVATION PATIENT INSTRUCTIONS
Discharge Summary/Instructions after an Endoscopic Procedure  Patient Name: Chi Carr  Patient MRN: [id number]  Patient YOB: 1975 Friday, November 17, 2023  Evin Hebert MD  Dear patient,  As a result of recent federal legislation (The Federal Cures Act), you may   receive lab or pathology results from your procedure in your [facility]   account before your physician is able to contact you. Your physician or   their representative will relay the results to you with their   recommendations at their soonest availability.  Thank you,  RESTRICTIONS:  During your procedure today, you received medications for sedation.  These   medications may affect your judgment, balance and coordination.  Therefore,   for 24 hours, you have the following restrictions:   - DO NOT drive a car, operate machinery, make legal/financial decisions,   sign important papers or drink alcohol.    ACTIVITY:  Today: no heavy lifting, straining or running due to procedural   sedation/anesthesia.  The following day: return to full activity including work.  DIET:  Eat and drink normally unless instructed otherwise.     TREATMENT FOR COMMON SIDE EFFECTS:  - Mild abdominal pain, nausea, belching, bloating or excessive gas:  rest,   eat lightly and use a heating pad.  - Sore Throat: treat with throat lozenges and/or gargle with warm salt   water.  - Because air was used during the procedure, expelling large amounts of air   from your rectum or belching is normal.  - If a bowel prep was taken, you may not have a bowel movement for 1-3 days.    This is normal.  SYMPTOMS TO WATCH FOR AND REPORT TO YOUR PHYSICIAN:  1. Abdominal pain or bloating, other than gas cramps.  2. Chest pain.  3. Back pain.  4. Signs of infection such as: chills or fever occurring within 24 hours   after the procedure.  5. Rectal bleeding, which would show as bright red, maroon, or black stools.   (A tablespoon of blood from the rectum is not serious,  especially if   hemorrhoids are present.)  6. Vomiting.  7. Weakness or dizziness.  GO DIRECTLY TO THE NEAREST EMERGENCY ROOM IF YOU HAVE ANY OF THE FOLLOWING:      Difficulty breathing              Chills and/or fever over 101 F   Persistent vomiting and/or vomiting blood   Severe abdominal pain   Severe chest pain   Black, tarry stools   Bleeding- more than one tablespoon   Any other symptom or condition that you feel may need urgent attention  Your doctor recommends these additional instructions:  If any biopsies were taken, your doctors clinic will contact you in 1 to 2   weeks with any results.  - Discharge patient to home (ambulatory).   - Resume previous diet.   - Continue present medications.   - Await pathology results.   - Repeat colonoscopy in 10 years for screening purposes.  For questions, problems or results please call your physician - Evin Hebert MD at Work:  [phone].  [facility] EMERGENCY ROOM PHONE NUMBER: [phone]  IF A COMPLICATION OR EMERGENCY SITUATION ARISES AND YOU ARE UNABLE TO REACH   YOUR PHYSICIAN - GO DIRECTLY TO THE EMERGENCY ROOM.  Evin Hebert MD  11/17/2023 9:56:06 AM  This report has been verified and signed electronically.  Dear patient,  As a result of recent federal legislation (The Federal Cures Act), you may   receive lab or pathology results from your procedure in your [facility]   account before your physician is able to contact you. Your physician or   their representative will relay the results to you with their   recommendations at their soonest availability.  Thank you,  [employer]ATION

## 2023-11-17 NOTE — ANESTHESIA POSTPROCEDURE EVALUATION
Anesthesia Post Evaluation    Patient: Chi Carr    Procedure(s) Performed: Procedure(s) (LRB):  COLONOSCOPY (N/A)    Final Anesthesia Type: general      Patient location during evaluation: PACU  Patient participation: Yes- Able to Participate  Level of consciousness: awake and alert and oriented  Post-procedure vital signs: reviewed and stable  Pain management: adequate  Airway patency: patent    PONV status at discharge: No PONV  Anesthetic complications: no      Cardiovascular status: hemodynamically stable  Respiratory status: unassisted, spontaneous ventilation and room air  Hydration status: euvolemic  Follow-up not needed.          Vitals Value Taken Time   /80 11/17/23 1018   Temp 36.7 °C (98 °F) 11/17/23 0958   Pulse 55 11/17/23 1018   Resp 18 11/17/23 1018   SpO2 96 % 11/17/23 1018         Event Time   Out of Recovery 10:29:26         Pain/Mika Score: Mika Score: 10 (11/17/2023 10:18 AM)

## 2023-12-20 DIAGNOSIS — F98.8 ATTENTION DEFICIT DISORDER, UNSPECIFIED HYPERACTIVITY PRESENCE: ICD-10-CM

## 2023-12-20 RX ORDER — METHYLPHENIDATE HYDROCHLORIDE 20 MG/1
20 CAPSULE, EXTENDED RELEASE ORAL DAILY
Qty: 30 CAPSULE | Refills: 0 | Status: SHIPPED | OUTPATIENT
Start: 2023-12-20

## 2023-12-20 RX ORDER — LISINOPRIL AND HYDROCHLOROTHIAZIDE 10; 12.5 MG/1; MG/1
1 TABLET ORAL DAILY
Qty: 90 TABLET | Refills: 3 | Status: SHIPPED | OUTPATIENT
Start: 2023-12-20

## 2023-12-20 RX ORDER — TRAZODONE HYDROCHLORIDE 50 MG/1
50 TABLET ORAL NIGHTLY
Qty: 90 TABLET | Refills: 3 | Status: SHIPPED | OUTPATIENT
Start: 2023-12-20 | End: 2024-12-14

## 2023-12-20 NOTE — TELEPHONE ENCOUNTER
Patient:   DANAY CARUSO            MRN: [id number]            FIN: [id number]              Age:   77 years     Sex:  FEMALE     :  41   Associated Diagnoses:   None   Author:   EVARISTO RODRIGES     This patient was evaluated by the resident, and also evaluated independently by me, Dr. Raji Jones, and I was involved in all of the medical decision making on this patient.please see the resident's note for further information on the history, physical, assessment and plan.  RESIDENT:   ALONDRA Mendoza  HPI: SEE BELOW FOR DETAILS  CC: chst pain  Location: ss  Duration: intemrittent x 2 wks  Onset: spnotaneous  Quality: pressure  Severity: moderate  Radiation: none  Exacerbating factors: none  Alleviating factors: none  Associated symptoms: See below  REVIEW OF SYMPTOMS  Denies fevers, weakness  Denies headache, neck pain, eye pain, sore throat  Denies   Denies SOB, cough, wheezing  Denies abd pain, vomiting, diarrhea  Denies dysuria  Denies leg pain, swelling  Denies rash, skin changes  Denies easy bleeding, bruising  Denies joint pain    PMH/PSH: See assessment and plan.  Soc Hx: denies drugs  Fam hx: no significant unless noted in assessment and plan.  PHYSICAL EXAM:  Well appearing patient in no distress  HEENT: MMM, tonsils without exudates  Neck: Supple  Heart: Regular rate and rhythm no murmurs  Lungs: CTAB, No wheezing, crackles, rhonchi.  Abdomen: Normal active bowel sounds, soft nontender nondistended, no rebound or guarding.  Extremities: No edema  Skin: No rash  Lymph: No Lymphadenopathy  Neuro: A&O, no focal deficits  EKG: _Sinus rhythm with rate of 70, T wave inversion inferiorly and laterally, no ST elevation, T wave inversion is new when compared to a previous EKG  MONITOR: normal sinus rhythm as interpreted by me.  Labs: noted and in the chart.  Radiology: noted and in the chart.  Result title:  XR CHEST 1V  Result status:  Final  Verified by:  RODOLFO LOBO on 2020  No care due was identified.  [facility] Embedded Care Due Messages. Reference number: [id number].   12/20/2023 7:46:25 AM CST   8:05  Impression:Under aerated exam with bibasilar regions of atelectasis.  Assessment and plan: _77-year-old with history of coronary artery disease status post stenting, diabetes hypertension hyperlipidemia, CHF, history of breast cancer and ovarian cancer, presents to the emergency department with 2 weeks of intermittent chest pain described as pressure, with some associated nausea and diaphoresis.  Currently pain-free.  Symptoms are worse today prompting him to come to the emergency department.  EKG has new T wave  inversion inferiorly and laterally.  Troponin is negative.  Patient had a negative stress test approximately 2 weeks ago.  Plan to admit the patient, give patient oral aspirin, have the patient seen by cardiology for possible angiogram.  No ST elevation on EKG.  No leg pain or swelling, pleuritic chest pain, hemoptysis, unlikely an acute pulmonary embolism contributing to symptoms.  ...  ...  CLINICAL IMPRESSION  #1 chest pain  2.  Abnormal EKG  3.  History of coronary artery disease CHF diabetes hypertension hyperlipidemia  _        Basic Information   Additional information: Chief Complaint from Nursing Triage Note : Chief Complaint ED  08/28/20 16:52 CDT Chief Complaint ED family states torso pain x2 days. states worsening generalized weakness. pt also reports vomiting.  08/28/20 16:23 CDT Chief Complaint ED family states torso pain x2 days. states worsening generalized weakness. pt also reports vomiting.  08/28/20 16:06 CDT Chief Complaint ED family states pain x2 weeks, was just here last week  .     Health Status   Allergies:    Allergic Reactions (All)  NKA  Canceled/Inactive Reactions (All)  Severity Not Documented  ACE Inhibitors (angiotensin converting enzyme inhibitors)- No reactions were documented.  Enalapril- No reactions were documented.  Lisinopril- No reactions were documented.  Losartan- No reactions were documented.  NKA,- No reactions were documented..     Past Medical/ Family/  Social History   Medical history:    All Problems  Diabetes / [id number] / Confirmed  Hypertension / 5712921517 / Confirmed  Diabetes / 251441005 / Confirmed  CA - Cancer of ovary / 6036890103 / Confirmed  Chronic pain / 468511200 / Confirmed  H/O: blood transfusion / [id number] / Confirmed  GERD - Gastro-esophageal reflux disease / 8221752257 / Confirmed  Hypercholesteremia / [id number] / Confirmed  Poor short-term memory / 046785386 / Confirmed  Breast cancer, right / 622075115 / Confirmed  CA - Breast cancer / 788566026 / Confirmed  Alzheimer's disease / 85737725 / Confirmed  Risk factors for obstructive sleep apnea / 28772904 / Confirmed  CHF - Congestive heart failure / 657753295 / Confirmed  HTN (hypertension) / [id number] / Confirmed  Cholecystectomy / 36367686 / Confirmed  Arthritis / 9102412 / Confirmed  History of Clostridium difficile intestinal infection / [id number] / Confirmed  CAD (coronary artery disease) / 99817868 / Confirmed  Hyperlipidemia / 93585778 / Confirmed.   Surgical history:    hernia repair in 2010 at 67 Years.  Cholecystectomy (12282627) in 2008 at 65 Years.  coronary stent LAD in 2006 at 63 Years.  hysterectomy in 1998 at 55 Years.  Mastectomy (6041972647) in 1988 at 45 Years.  Mastectomy of right breast (8048499090).  Comments:  03/29/2016 02:54 - Geneva Quinones  with lymph node removal  Hysterectomy (592089252).  Stent placement (731311003).  Comments:  08/30/2016 14:25 - KEELY RAÍMREZ  cardiac stent  colonoscopy.  Comments:  01/08/2019 10:56 - MILTON DENNY  20 yrs ago.   Family history: Include Family History   Dialysis  BROTHER  Alcoholism  BROTHER  Myocardial infarction  MOTHER  CA - Breast cancer  SISTER  Liver cancer  MOTHER  .   Social history:    Social & Psychosocial Habits  Alcohol  08/30/2016  Use: None, None    Blackouts: Denies    Change in Tolerance: Denies  02/21/2019  Alcohol  Abuse in Household No    Use: None, None    If current Alcohol user: More than 5 (male) or 4 (female) dr No    IF YES, have you consumed this quantity 5 times or more in t No  08/19/2019  Use: None, None  Exercise  08/30/2016  Duration (Average Number of Minutes): 30    Do You Exercise: Regularly    Duration (Average Number of Minutes): 30    Times Per Week: Daily, Daily    Exercise Type: Walking  02/21/2019  Do You Exercise: Regularly    Times Per Week: 5-6 times/week, 5-6 times/week    Exercise Type: Walking  Home/Environment  10/29/2015  Alcohol Abuse in Household: No    Substance Abuse in Household: No    Smoker in Household: No    Alcohol Abuse in Household: No    Substance Abuse in Household: No    Smoker in Household: No  08/30/2016  Alcohol Abuse in Household: No    Substance Abuse in Household: No    Smoker in Household: No    Alcohol Abuse in Household: No    Patient Lives With: Daughter    Substance Abuse in Household: No    Living Situation - Household 1: Lives With Family    Smoker in Household: No    Ambulation: Independent    Bathing: Independent    Dressing: Independent    Driving: Not Performed    Eating: Independent    Elimination: Independent    Grooming: Independent    Preparing Meal: Independent    Taking Meds: Independent    Toileting: Independent    Transfers: Independent    Assistive Devices Home: Glasses  Sexual  02/21/2019  Self described orientation: Straight or heterosexual    What is your current gender identity? (Check all that apply) Identifies as female   Gender on Insurance Female    Preferred Pronouns Female    Self described orientation: Straight or heterosexual  Substance Abuse  08/30/2016  Use: None, None  02/21/2019  Substance Abuse in Household: No    Use: None, None  08/19/2019  Use: None, None  Tobacco  03/29/2016  Smoking Tobacco Use: Never smoker    Smoked/Smokeless Tobacco in Last 30 Days: No    Smoking Tobacco Use: Never smoker    Smoked/Smokeless Tobacco in Last 30 Days:  No    Smoking Tobacco Use: Never smoker  02/21/2019  Smoking Tobacco Use: Never smoker    Smoker in Household: No    Smoking Tobacco Use: Never smoker    Smoked/Smokeless Tobacco in Last 30 Days: No    Smoking Tobacco Use: Never smoker    Smokeless tobacco use: Never  08/19/2019  Smoking Tobacco Use: Never smoker    Smoked/Smokeless Tobacco in Last 30 Days: No    Smoking Tobacco Use: Never smoker    Smoked/Smokeless Tobacco in Last 30 Days: No    Smoking Tobacco Use: Never smoker    Smokeless tobacco use: Never  Cultural/Restoration Practices  08/30/2016  Cultural/Restoration Practices: none    Continue Cultural/Restoration Practices While in Hospital: No    Comment: none - 08/30/2016 14:24 - KEELY RAMÍREZ  .     Physical Examination              Vital Signs   ED Vital Sign   08/28/20 18:50 CDT Heart/Pulse Rate 70  Normal    Respiration Rate 16 breaths/min  Normal    SpO2 99 %  Normal    NIBP Systolic 121  Normal    NIBP Diastolic 70  Normal    NIBP MAP 87  Normal   08/28/20 17:49 CDT Heart/Pulse Rate 64  Normal    Respiration Rate 16 breaths/min  Normal    SpO2 99 %  Normal    NIBP Systolic 144  HI    NIBP Diastolic 71  Normal    NIBP MAP 95  Normal   08/28/20 16:52 CDT Heart/Pulse Rate 66  Normal    Respiration Rate 16 breaths/min  Normal    SpO2 100 %  Normal    NIBP Systolic 136  Normal    NIBP Diastolic 86  Normal    NIBP   Normal   08/28/20 16:23 CDT Temperature - VS 36.8 deg_C  Normal    Temperature Source - VS Temporal    Heart/Pulse Rate 69  Normal    Pulse Source Monitor    Respiration Rate 16 breaths/min  Normal    SpO2 99 %  Normal    NIBP Systolic 137  Normal    NIBP Diastolic 77  Normal    NIBP Source Right Arm    NIBP MAP 97  Normal   .     Impression and Plan   Diagnosis   CLINICAL IMPRESSION  #1 chest pain  2.  Abnormal EKG  3.  History of coronary artery disease CHF diabetes hypertension hyperlipidemia

## 2023-12-21 ENCOUNTER — OFFICE VISIT (OUTPATIENT)
Dept: PODIATRY | Facility: CLINIC | Age: 48
End: 2023-12-21
Payer: COMMERCIAL

## 2023-12-21 VITALS
HEIGHT: 69 IN | WEIGHT: 267 LBS | HEART RATE: 63 BPM | DIASTOLIC BLOOD PRESSURE: 98 MMHG | BODY MASS INDEX: 39.55 KG/M2 | SYSTOLIC BLOOD PRESSURE: 168 MMHG

## 2023-12-21 DIAGNOSIS — M19.079 1ST MTP ARTHRITIS: Primary | ICD-10-CM

## 2023-12-21 PROCEDURE — 4010F PR ACE/ARB THEARPY RXD/TAKEN: ICD-10-PCS | Mod: CPTII,S$GLB,, | Performed by: STUDENT IN AN ORGANIZED HEALTH CARE EDUCATION/TRAINING PROGRAM

## 2023-12-21 PROCEDURE — 3077F SYST BP >= 140 MM HG: CPT | Mod: CPTII,S$GLB,, | Performed by: STUDENT IN AN ORGANIZED HEALTH CARE EDUCATION/TRAINING PROGRAM

## 2023-12-21 PROCEDURE — 20600 DRAIN/INJ JOINT/BURSA W/O US: CPT | Mod: LT,S$GLB,, | Performed by: STUDENT IN AN ORGANIZED HEALTH CARE EDUCATION/TRAINING PROGRAM

## 2023-12-21 PROCEDURE — 4010F ACE/ARB THERAPY RXD/TAKEN: CPT | Mod: CPTII,S$GLB,, | Performed by: STUDENT IN AN ORGANIZED HEALTH CARE EDUCATION/TRAINING PROGRAM

## 2023-12-21 PROCEDURE — 99203 PR OFFICE/OUTPT VISIT, NEW, LEVL III, 30-44 MIN: ICD-10-PCS | Mod: 25,S$GLB,, | Performed by: STUDENT IN AN ORGANIZED HEALTH CARE EDUCATION/TRAINING PROGRAM

## 2023-12-21 PROCEDURE — 20600 SMALL JOINT ASPIRATION/INJECTION: L GREAT MTP: ICD-10-PCS | Mod: LT,S$GLB,, | Performed by: STUDENT IN AN ORGANIZED HEALTH CARE EDUCATION/TRAINING PROGRAM

## 2023-12-21 PROCEDURE — 3080F PR MOST RECENT DIASTOLIC BLOOD PRESSURE >= 90 MM HG: ICD-10-PCS | Mod: CPTII,S$GLB,, | Performed by: STUDENT IN AN ORGANIZED HEALTH CARE EDUCATION/TRAINING PROGRAM

## 2023-12-21 PROCEDURE — 99999 PR PBB SHADOW E&M-EST. PATIENT-LVL III: ICD-10-PCS | Mod: PBBFAC,,, | Performed by: STUDENT IN AN ORGANIZED HEALTH CARE EDUCATION/TRAINING PROGRAM

## 2023-12-21 PROCEDURE — 1159F PR MEDICATION LIST DOCUMENTED IN MEDICAL RECORD: ICD-10-PCS | Mod: CPTII,S$GLB,, | Performed by: STUDENT IN AN ORGANIZED HEALTH CARE EDUCATION/TRAINING PROGRAM

## 2023-12-21 PROCEDURE — 3044F PR MOST RECENT HEMOGLOBIN A1C LEVEL <7.0%: ICD-10-PCS | Mod: CPTII,S$GLB,, | Performed by: STUDENT IN AN ORGANIZED HEALTH CARE EDUCATION/TRAINING PROGRAM

## 2023-12-21 PROCEDURE — 3044F HG A1C LEVEL LT 7.0%: CPT | Mod: CPTII,S$GLB,, | Performed by: STUDENT IN AN ORGANIZED HEALTH CARE EDUCATION/TRAINING PROGRAM

## 2023-12-21 PROCEDURE — 99999 PR PBB SHADOW E&M-EST. PATIENT-LVL III: CPT | Mod: PBBFAC,,, | Performed by: STUDENT IN AN ORGANIZED HEALTH CARE EDUCATION/TRAINING PROGRAM

## 2023-12-21 PROCEDURE — 99203 OFFICE O/P NEW LOW 30 MIN: CPT | Mod: 25,S$GLB,, | Performed by: STUDENT IN AN ORGANIZED HEALTH CARE EDUCATION/TRAINING PROGRAM

## 2023-12-21 PROCEDURE — 3080F DIAST BP >= 90 MM HG: CPT | Mod: CPTII,S$GLB,, | Performed by: STUDENT IN AN ORGANIZED HEALTH CARE EDUCATION/TRAINING PROGRAM

## 2023-12-21 PROCEDURE — 3008F BODY MASS INDEX DOCD: CPT | Mod: CPTII,S$GLB,, | Performed by: STUDENT IN AN ORGANIZED HEALTH CARE EDUCATION/TRAINING PROGRAM

## 2023-12-21 PROCEDURE — 3008F PR BODY MASS INDEX (BMI) DOCUMENTED: ICD-10-PCS | Mod: CPTII,S$GLB,, | Performed by: STUDENT IN AN ORGANIZED HEALTH CARE EDUCATION/TRAINING PROGRAM

## 2023-12-21 PROCEDURE — 3077F PR MOST RECENT SYSTOLIC BLOOD PRESSURE >= 140 MM HG: ICD-10-PCS | Mod: CPTII,S$GLB,, | Performed by: STUDENT IN AN ORGANIZED HEALTH CARE EDUCATION/TRAINING PROGRAM

## 2023-12-21 PROCEDURE — 1159F MED LIST DOCD IN RCRD: CPT | Mod: CPTII,S$GLB,, | Performed by: STUDENT IN AN ORGANIZED HEALTH CARE EDUCATION/TRAINING PROGRAM

## 2023-12-21 RX ORDER — DEXAMETHASONE SODIUM PHOSPHATE 4 MG/ML
2 INJECTION, SOLUTION INTRA-ARTICULAR; INTRALESIONAL; INTRAMUSCULAR; INTRAVENOUS; SOFT TISSUE ONCE
Status: COMPLETED | OUTPATIENT
Start: 2023-12-21 | End: 2023-12-21

## 2023-12-21 RX ADMIN — DEXAMETHASONE SODIUM PHOSPHATE 2 MG: 4 INJECTION, SOLUTION INTRA-ARTICULAR; INTRALESIONAL; INTRAMUSCULAR; INTRAVENOUS; SOFT TISSUE at 02:12

## 2023-12-21 NOTE — PROGRESS NOTES
Subjective:     Patient ID: Chi Carr is a 48 y.o. male.    Chief Complaint: Foot Pain (Left hallux )    Chi is a 48 y.o. male who presents to the podiatry clinic  with complaint of  left foot pain. Onset of the symptoms was several weeks ago. Precipitating event:  relates was hanging keysha lights outside on a ladder . Current symptoms include:  pain with pressure to top of left great toe joint . Aggravating factors:  pressure to top of toe . Symptoms have gradually improved. Patient has had prior foot problems. Evaluation to date: none. Treatment to date: none. Patients rates pain 5/10 on pain scale.    Review of Systems   Constitutional: Negative for chills, decreased appetite, diaphoresis and fever.   HENT:  Negative for congestion and hearing loss.    Cardiovascular:  Negative for chest pain, claudication, leg swelling and syncope.   Respiratory:  Negative for cough and shortness of breath.    Skin:  Negative for color change, dry skin, flushing, itching, nail changes, poor wound healing and rash.   Musculoskeletal:  Positive for joint pain. Negative for back pain and joint swelling.   Gastrointestinal:  Negative for nausea and vomiting.   Neurological:  Negative for focal weakness, numbness, paresthesias and weakness.   Psychiatric/Behavioral:  Negative for altered mental status. The patient is not nervous/anxious.         Objective:     Physical Exam  Constitutional:       General: He is not in acute distress.     Appearance: Normal appearance. He is well-developed. He is not diaphoretic.   Cardiovascular:      Comments: Dorsalis pedis and posterior tibial pulses are within normal limits. Skin temperature is within normal limits. Toes are cool to touch and feet are warm proximally. Hair growth is within normal limits. Skin is normotrophic and without hyperpigmentation. No edema noted. No varicosities or spider veins noted, bilaterally.   Musculoskeletal:         General: Tenderness present.       Comments: Adequate joint range of motion without pain, limitation, nor crepitation to foot and ankle joints. Muscle strength is 5/5 in all groups bilaterally.    Tenderness to left 1st metatarsophalangeal joint with palpation to medial dorsal aspect of joint. Pain with plantarflexion. Decreased dorsiflexion to 1st MTP with < 20 degrees dorsiflexion noted loaded.     Neutral arches bilaterally   Feet:      Right foot:      Skin integrity: Dry skin present. No ulcer, blister, erythema or warmth.      Left foot:      Skin integrity: Dry skin present. No ulcer, blister, erythema or warmth.   Skin:     General: Skin is warm and dry.      Capillary Refill: Capillary refill takes less than 2 seconds.      Comments: Skin is warm and dry, no acute signs of infection noted bilaterally      Toenails are well trimmed and of normal morphology    Otherwise, no open wounds, macerations or hyperkeratotic lesions, bilaterally            Neurological:      Mental Status: He is alert and oriented to person, place, and time.      Sensory: No sensory deficit.      Motor: No abnormal muscle tone.      Comments: Light touch within normal limits.    Psychiatric:         Mood and Affect: Mood normal.         Behavior: Behavior normal.         Thought Content: Thought content normal.           Assessment:      Encounter Diagnosis   Name Primary?    1st MTP arthritis Yes     Plan:     Chi was seen today for foot pain.    Diagnoses and all orders for this visit:    1st MTP arthritis    Other orders  -     dexAMETHasone injection 2 mg      I counseled the patient on his conditions, their implications and medical management.  He elects for cortisone injection to left 1st metatarsophalangeal joint. Discussed possible side effects from injection including but NOT limited to discoloration of skin, erosion of soft tissue, and increased likelihood of rupture of a soft tissue structure (ie. Plantar fascia, muscle, tendon, ligament, or  capsule in the area of injection). Patient indicates understanding of my explanation, and patient gives verbal consent for injection of affected area. A time out was performed to verify the  correct  patient and site, prior to initiation of procedure.    RICE, OTC NSAIDs PRN pain  Recommend supportive tennis shoes at all times while ambulating  Declines xray  Return to clinic PRN

## 2023-12-21 NOTE — PROCEDURES
Small Joint Aspiration/Injection: L great MTP    Date/Time: 12/21/2023 2:30 PM    Performed by: Belia Horvath DPM  Authorized by: Belia Horvath DPM    Consent Done?:  Yes (Verbal)  Indications:  Arthritis  Location:  Great toe  Site:  L great MTP  Needle size:  25 G  Approach:  Dorsal  Medications:  2 mg dexamethasone (DECADRON) injection 4 mg/mL

## 2024-01-29 ENCOUNTER — ON-DEMAND VIRTUAL (OUTPATIENT)
Dept: URGENT CARE | Facility: CLINIC | Age: 49
End: 2024-01-29
Payer: COMMERCIAL

## 2024-01-29 DIAGNOSIS — J98.8 VIRAL RESPIRATORY ILLNESS: Primary | ICD-10-CM

## 2024-01-29 DIAGNOSIS — B97.89 VIRAL RESPIRATORY ILLNESS: Primary | ICD-10-CM

## 2024-01-29 PROCEDURE — 99203 OFFICE O/P NEW LOW 30 MIN: CPT | Mod: 95,,,

## 2024-01-29 RX ORDER — FLUTICASONE PROPIONATE 50 MCG
1 SPRAY, SUSPENSION (ML) NASAL DAILY
Qty: 9.9 ML | Refills: 0 | Status: SHIPPED | OUTPATIENT
Start: 2024-01-29

## 2024-01-29 RX ORDER — BENZONATATE 100 MG/1
100 CAPSULE ORAL 3 TIMES DAILY PRN
Qty: 30 CAPSULE | Refills: 0 | Status: SHIPPED | OUTPATIENT
Start: 2024-01-29 | End: 2024-02-08

## 2024-01-29 NOTE — PROGRESS NOTES
Subjective:      Patient ID: Chi Carr is a 48 y.o. male.    Vitals:  vitals were not taken for this visit.     Chief Complaint: Sinus Problem      Visit Type: TELE AUDIOVISUAL    Present with the patient at the time of consultation: TELEMED PRESENT WITH PATIENT: None    Past Medical History:   Diagnosis Date    ADHD (attention deficit hyperactivity disorder)     Hypertension     Morbid obesity with BMI of 40.0-44.9, adult     Other insomnia     Prediabetes      Past Surgical History:   Procedure Laterality Date    COLONOSCOPY N/A 11/17/2023    Procedure: COLONOSCOPY;  Surgeon: DORIAN Hebert MD;  Location: [address]);  Service: Endoscopy;  Laterality: N/A;  Ref by: DO Claudia Choi  Instructions to portal  DBM  11/10-lvm for precall-MS  11/15-precall complete-Kpvt     Review of patient's allergies indicates:  No Known Allergies  Current Outpatient Medications on File Prior to Visit   Medication Sig Dispense Refill    lisinopriL-hydrochlorothiazide (PRINZIDE,ZESTORETIC) 10-12.5 mg per tablet Take 1 tablet by mouth once daily. 90 tablet 3    methylphenidate HCl (RITALIN LA) 20 MG 24 hr capsule Take 1 capsule (20 mg total) by mouth once daily. 30 capsule 0    traZODone (DESYREL) 50 MG tablet Take 1 tablet (50 mg total) by mouth every evening. 90 tablet 3     No current facility-administered medications on file prior to visit.     Family History   Problem Relation Age of Onset    Cancer Mother         sarcoma    Prostate cancer Father     Diabetes Maternal Grandmother     Heart disease Neg Hx     Stroke Neg Hx        Medications Ordered                [id number] - [place] - [address] [facility]   [address] [facility] [place] [address]    Telephone: [phone]   Fax: [phone]   Hours: Not open 24 hours                         E-Prescribed (2 of 2)              benzonatate (TESSALON) 100 MG capsule    Sig: Take 1 capsule (100 mg total) by mouth 3 (three) times daily as  needed for Cough.       Start: 24     Quantity: 30 capsule Refills: 0                         fluticasone propionate (FLONASE) 50 mcg/actuation nasal spray    Si spray (50 mcg total) by Each Nostril route once daily.       Start: 24     Quantity: 9.9 mL Refills: 0                           Ohs Peq Odvv Intake    2024 12:39 PM CST - Filed by Patient   What is your current physical address in the event of a medical emergency? [place] [address]   Are you able to take your vital signs? No   Please attach any relevant images or files          Patient states that on Friday he began to have a dry cough, with fatigue, fever(100.0) and chills. Patient states that he has continued to have this. Patient states that he has not ran a fever since Saturday but has continued to have the cough with fatigue. Patient states that on Friday he did take a covid test and it was negative. Patient states that he has taken benadryl and states that it does help with the nasal congestion patient states that he did take a day time sinus medication but states that it has not helped.      Sinus Problem  Associated symptoms include congestion, coughing and sinus pressure.       Constitution: Positive for fatigue and fever.   HENT:  Positive for congestion, postnasal drip, sinus pain and sinus pressure.    Neck: neck negative.   Cardiovascular: Negative.    Eyes: Negative.    Respiratory:  Positive for cough.    Gastrointestinal: Negative.    Endocrine: negative.   Genitourinary: Negative.    Musculoskeletal: Negative.    Skin: Negative.    Allergic/Immunologic: Negative.    Neurological: Negative.    Hematologic/Lymphatic: Negative.    Psychiatric/Behavioral: Negative.          Objective:   The physical exam was conducted virtually.  Physical Exam   Constitutional: He is oriented to person, place, and time.   HENT:   Head: Normocephalic and atraumatic.   Nose: Congestion present.   Eyes: Conjunctivae are  normal. Pupils are equal, round, and reactive to light. Extraocular movement intact   Neck: Neck supple.   Pulmonary/Chest: Effort normal.   Abdominal: Normal appearance.   Musculoskeletal: Normal range of motion.         General: Normal range of motion.   Neurological: no focal deficit. He is alert, oriented to person, place, and time and at baseline.   Skin: Skin is warm.   Psychiatric: His behavior is normal. Mood, judgment and thought content normal.       Assessment:     1. Viral respiratory illness        Plan:       Viral respiratory illness  -     benzonatate (TESSALON) 100 MG capsule; Take 1 capsule (100 mg total) by mouth 3 (three) times daily as needed for Cough.  Dispense: 30 capsule; Refill: 0  -     fluticasone propionate (FLONASE) 50 mcg/actuation nasal spray; 1 spray (50 mcg total) by Each Nostril route once daily.  Dispense: 9.9 mL; Refill: 0

## 2024-06-24 ENCOUNTER — PATIENT MESSAGE (OUTPATIENT)
Dept: INTERNAL MEDICINE | Facility: CLINIC | Age: 49
End: 2024-06-24
Payer: COMMERCIAL

## 2024-06-24 DIAGNOSIS — F98.8 ATTENTION DEFICIT DISORDER, UNSPECIFIED HYPERACTIVITY PRESENCE: ICD-10-CM

## 2024-06-25 RX ORDER — METHYLPHENIDATE HYDROCHLORIDE 20 MG/1
20 CAPSULE, EXTENDED RELEASE ORAL DAILY
Qty: 30 CAPSULE | Refills: 0 | Status: SHIPPED | OUTPATIENT
Start: 2024-06-25

## 2024-06-25 RX ORDER — LISINOPRIL AND HYDROCHLOROTHIAZIDE 10; 12.5 MG/1; MG/1
1 TABLET ORAL DAILY
Qty: 90 TABLET | Refills: 1 | Status: SHIPPED | OUTPATIENT
Start: 2024-06-25

## 2024-06-25 NOTE — TELEPHONE ENCOUNTER
Care Due:                  Date            Visit Type   Department     Provider  --------------------------------------------------------------------------------                                MYCHART                              ANNUAL                              CHECKUP/PHY  MET INTERNAL  Last Visit: 06-      [facility]       Oc [facility]  Next Visit: None Scheduled  None         None Found                                                            Last  Test          Frequency    Reason                     Performed    Due Date  --------------------------------------------------------------------------------    Office Visit  15 months..  lisinopriL-hydrochlorothi  06-   09-                             azide, traZODone.........    CMP.........  12 months..  lisinopriL-hydrochlorothi  06- 06-                             azide....................    [employer] Embedded Care Due Messages. Reference number: [id number].   6/25/2024 7:58:33 AM CDT

## 2024-06-26 DIAGNOSIS — I10 PRIMARY HYPERTENSION: ICD-10-CM

## 2024-09-27 ENCOUNTER — OFFICE VISIT (OUTPATIENT)
Dept: INTERNAL MEDICINE | Facility: CLINIC | Age: 49
End: 2024-09-27
Payer: COMMERCIAL

## 2024-09-27 VITALS
RESPIRATION RATE: 18 BRPM | SYSTOLIC BLOOD PRESSURE: 128 MMHG | TEMPERATURE: 97 F | OXYGEN SATURATION: 96 % | BODY MASS INDEX: 40.94 KG/M2 | DIASTOLIC BLOOD PRESSURE: 82 MMHG | HEIGHT: 69 IN | HEART RATE: 70 BPM | WEIGHT: 276.44 LBS

## 2024-09-27 DIAGNOSIS — M25.561 CHRONIC PAIN OF BOTH KNEES: ICD-10-CM

## 2024-09-27 DIAGNOSIS — I10 PRIMARY HYPERTENSION: ICD-10-CM

## 2024-09-27 DIAGNOSIS — F98.8 ATTENTION DEFICIT DISORDER, UNSPECIFIED HYPERACTIVITY PRESENCE: ICD-10-CM

## 2024-09-27 DIAGNOSIS — M25.562 CHRONIC PAIN OF BOTH KNEES: ICD-10-CM

## 2024-09-27 DIAGNOSIS — Z00.00 ANNUAL PHYSICAL EXAM: Primary | ICD-10-CM

## 2024-09-27 DIAGNOSIS — E66.01 OBESITY, CLASS III, BMI 40-49.9 (MORBID OBESITY): ICD-10-CM

## 2024-09-27 DIAGNOSIS — G89.29 CHRONIC PAIN OF BOTH KNEES: ICD-10-CM

## 2024-09-27 PROCEDURE — 99999 PR PBB SHADOW E&M-EST. PATIENT-LVL III: CPT | Mod: PBBFAC,,, | Performed by: INTERNAL MEDICINE

## 2024-09-27 RX ORDER — METHYLPHENIDATE HYDROCHLORIDE 20 MG/1
20 CAPSULE, EXTENDED RELEASE ORAL DAILY
Qty: 30 CAPSULE | Refills: 0 | Status: SHIPPED | OUTPATIENT
Start: 2024-09-27

## 2024-09-27 NOTE — PROGRESS NOTES
Subjective     Patient ID: Chi Carr is a 49 y.o. male.    Chief Complaint: Annual Exam    HPI  49 y.o. Male here for annual exam.      Vaccines: Influenza (declined); Tetanus (will consider)  Eye exam: current         Colonoscopy: 11/23     Past Medical History:  No date: ADHD (attention deficit hyperactivity disorder)  No date: Hypertension  No date: Morbid obesity with BMI of 40.0-44.9, adult  No date: Other insomnia  No date: Prediabetes  History reviewed. No pertinent surgical history.  Social History    Socioeconomic History      Marital status:       Number of children: 1    Tobacco Use      Smoking status: Never Smoker      Smokeless tobacco: Never Used    Substance and Sexual Activity      Alcohol use: Yes        Comment: social      Drug use: Not Currently      Sexual activity: Yes        Partners: Female    Social History Narrative            Review of patient's allergies indicates:  No Known Allergies  Review of Systems   Constitutional:  Negative for activity change, appetite change, chills, diaphoresis, fatigue, fever and unexpected weight change.   HENT:  Negative for nasal congestion, mouth sores, postnasal drip, rhinorrhea, sinus pressure/congestion, sneezing, sore throat, trouble swallowing and voice change.    Eyes:  Negative for discharge, itching and visual disturbance.   Respiratory:  Negative for cough, chest tightness, shortness of breath and wheezing.    Cardiovascular:  Negative for chest pain, palpitations and leg swelling.   Gastrointestinal:  Negative for abdominal pain, blood in stool, constipation, diarrhea, nausea and vomiting.   Endocrine: Negative for cold intolerance and heat intolerance.   Genitourinary:  Negative for difficulty urinating, dysuria, flank pain, hematuria and urgency.   Musculoskeletal:  Negative for arthralgias, back pain, myalgias and neck pain.   Integumentary:  Negative for rash and wound.   Allergic/Immunologic: Negative for  environmental allergies and food allergies.   Neurological:  Negative for dizziness, tremors, seizures, syncope, weakness and headaches.   Hematological:  Negative for adenopathy. Does not bruise/bleed easily.   Psychiatric/Behavioral:  Positive for decreased concentration and sleep disturbance. Negative for confusion, self-injury and suicidal ideas. The patient is not nervous/anxious.           Objective     Physical Exam  Constitutional:       General: He is not in acute distress.     Appearance: Normal appearance. He is well-developed. He is not diaphoretic.   HENT:      Head: Normocephalic and atraumatic.      Right Ear: External ear normal.      Left Ear: External ear normal.      Nose: Nose normal.      Mouth/Throat:      Pharynx: No oropharyngeal exudate.   Eyes:      General: No scleral icterus.        Right eye: No discharge.         Left eye: No discharge.      Conjunctiva/sclera: Conjunctivae normal.      Pupils: Pupils are equal, round, and reactive to light.   Neck:      Vascular: No JVD.   Cardiovascular:      Rate and Rhythm: Normal rate and regular rhythm.      Pulses: Normal pulses.      Heart sounds: Normal heart sounds. No murmur heard.  Pulmonary:      Effort: Pulmonary effort is normal. No respiratory distress.      Breath sounds: Normal breath sounds. No wheezing or rales.   Abdominal:      General: Bowel sounds are normal.      Tenderness: There is no abdominal tenderness. There is no guarding or rebound.   Musculoskeletal:      Cervical back: Normal range of motion and neck supple.      Right lower leg: No edema.      Left lower leg: No edema.   Lymphadenopathy:      Cervical: No cervical adenopathy.   Skin:     General: Skin is warm and dry.      Capillary Refill: Capillary refill takes less than 2 seconds.      Coloration: Skin is not pale.      Findings: No rash.   Neurological:      Mental Status: He is alert and oriented to person, place, and time. Mental status is at baseline.       Cranial Nerves: No cranial nerve deficit.   Psychiatric:         Mood and Affect: Mood normal.         Behavior: Behavior normal.            Assessment and Plan     1. Annual physical exam    2. Attention deficit disorder, unspecified hyperactivity presence    3. Primary hypertension    4. Obesity, Class III, BMI 40-49.9 (morbid obesity)    5. Chronic pain of both knees           Blood work ordered      Morbid Obesity- proper diet/exercise stressed      HTN- pt holding the Prinzide 10-12.5 mg qd   -call if BP consistently > 130/80      Chronic B/L knee pain- stable      ADD- stable on Ritalin PRN     Insomnia- stable on Trazodone 50 mg qHS      F/u in 6 months

## 2024-10-01 ENCOUNTER — PATIENT MESSAGE (OUTPATIENT)
Dept: INTERNAL MEDICINE | Facility: CLINIC | Age: 49
End: 2024-10-01
Payer: COMMERCIAL

## 2024-11-25 RX ORDER — TRAZODONE HYDROCHLORIDE 50 MG/1
50 TABLET ORAL NIGHTLY
Qty: 90 TABLET | Refills: 3 | Status: SHIPPED | OUTPATIENT
Start: 2024-11-25 | End: 2025-11-20

## 2024-11-25 NOTE — TELEPHONE ENCOUNTER
Care Due:                  Date            Visit Type   Department     Provider  --------------------------------------------------------------------------------                                MYCHART                              FOLLOWUP/OF  [facility] INTERNAL  Last Visit: 09-      FICE VISIT   MEDICINE       Oc Pa  Next Visit: None Scheduled  None         None Found                                                            Last  Test          Frequency    Reason                     Performed    Due Date  --------------------------------------------------------------------------------    CMP.........  12 months..  lisinopriL-hydrochlorothi  06- 06-                             azide....................    [employer] Embedded Care Due Messages. Reference number: [id number].   11/25/2024 11:57:30 AM CST

## 2024-12-27 DIAGNOSIS — F98.8 ATTENTION DEFICIT DISORDER: ICD-10-CM

## 2024-12-27 RX ORDER — LISINOPRIL AND HYDROCHLOROTHIAZIDE 10; 12.5 MG/1; MG/1
1 TABLET ORAL
Qty: 90 TABLET | Refills: 3 | Status: SHIPPED | OUTPATIENT
Start: 2024-12-27

## 2024-12-27 NOTE — TELEPHONE ENCOUNTER
No care due was identified.  [facility] Embedded Care Due Messages. Reference number: [id number].   12/27/2024 12:31:09 AM CST

## 2024-12-27 NOTE — TELEPHONE ENCOUNTER
Refill Routing Note   Medication(s) are not appropriate for processing by [facility] for the following reason(s):        Required labs outdated    [facility] action(s):  Defer               Appointments  past 12m or future 3m with PCP    Date Provider   Last Visit   9/27/2024 Oc Pa, DO   Next Visit   Visit date not found Oc Pa, DO   ED visits in past 90 days: 0        Note composed:9:07 AM 12/27/2024

## 2025-01-28 DIAGNOSIS — F98.8 ATTENTION DEFICIT DISORDER, UNSPECIFIED HYPERACTIVITY PRESENCE: ICD-10-CM

## 2025-01-28 RX ORDER — METHYLPHENIDATE HYDROCHLORIDE 20 MG/1
20 CAPSULE, EXTENDED RELEASE ORAL DAILY
Qty: 30 CAPSULE | Refills: 0 | OUTPATIENT
Start: 2025-01-28

## 2025-01-28 NOTE — TELEPHONE ENCOUNTER
Care Due:                  Date            Visit Type   Department     Provider  --------------------------------------------------------------------------------                                MYCHART                              FOLLOWUP/OF  [facility] INTERNAL  Last Visit: 09-      FICE VISIT   MEDICINE       Oc Pa  Next Visit: None Scheduled  None         None Found                                                            Last  Test          Frequency    Reason                     Performed    Due Date  --------------------------------------------------------------------------------    CMP.........  12 months..  lisinopriL-hydrochlorothi  06- 06-                             azide....................    [employer] Embedded Care Due Messages. Reference number: [id number].   1/28/2025 7:32:55 AM CST

## 2025-01-28 NOTE — TELEPHONE ENCOUNTER
----- Message from Toro sent at 1/28/2025  2:46 PM CST -----  Contact: [phone]@[email or web address]  Patient is returning a phone call. Yes     Who left a message for the patient: Madan Gomez MA     Does patient know what this is regarding:      Would you like a call back, or a response through your [facility] portal?:   Call back     Comments: Patient says please just make his apt for any time tomorrow 1/29/25 besides the times 3-3:30pm

## 2025-01-29 ENCOUNTER — OFFICE VISIT (OUTPATIENT)
Dept: INTERNAL MEDICINE | Facility: CLINIC | Age: 50
End: 2025-01-29
Payer: COMMERCIAL

## 2025-01-29 DIAGNOSIS — F98.8 ATTENTION DEFICIT DISORDER, UNSPECIFIED TYPE: ICD-10-CM

## 2025-01-29 DIAGNOSIS — G47.00 INSOMNIA, UNSPECIFIED TYPE: ICD-10-CM

## 2025-01-29 DIAGNOSIS — G47.33 OSA (OBSTRUCTIVE SLEEP APNEA): Primary | ICD-10-CM

## 2025-01-29 PROCEDURE — 1160F RVW MEDS BY RX/DR IN RCRD: CPT | Mod: CPTII,95,, | Performed by: NURSE PRACTITIONER

## 2025-01-29 PROCEDURE — 1159F MED LIST DOCD IN RCRD: CPT | Mod: CPTII,95,, | Performed by: NURSE PRACTITIONER

## 2025-01-29 PROCEDURE — 98006 SYNCH AUDIO-VIDEO EST MOD 30: CPT | Mod: 95,,, | Performed by: NURSE PRACTITIONER

## 2025-01-29 RX ORDER — METHYLPHENIDATE HYDROCHLORIDE 20 MG/1
20 CAPSULE, EXTENDED RELEASE ORAL EVERY MORNING
Qty: 30 CAPSULE | Refills: 0 | Status: SHIPPED | OUTPATIENT
Start: 2025-03-01

## 2025-01-29 RX ORDER — METHYLPHENIDATE HYDROCHLORIDE 20 MG/1
20 CAPSULE, EXTENDED RELEASE ORAL EVERY MORNING
Qty: 30 CAPSULE | Refills: 0 | Status: SHIPPED | OUTPATIENT
Start: 2025-01-29

## 2025-01-29 RX ORDER — METHYLPHENIDATE HYDROCHLORIDE 20 MG/1
20 CAPSULE, EXTENDED RELEASE ORAL DAILY
Qty: 30 CAPSULE | Refills: 0 | Status: SHIPPED | OUTPATIENT
Start: 2025-04-01

## 2025-01-29 NOTE — ASSESSMENT & PLAN NOTE
-Chronic, stable.   -Continue with Ritalin LA 20 mg daily  -F/u in 3 months for additional refills.

## 2025-01-29 NOTE — PROGRESS NOTES
The patient location is: [place]  The chief complaint leading to consultation is: ADD management.     Visit type: audiovisual    Subjective:  HPI: 49 y.o. male contacted virtually for ongoing management of ADD.  Patient has been consistent with Ritalin LA 20 mg p.r.n. with good relief.  Patient indicates he takes when he has to be engaged for long periods of time.  He denies any reportable side effects with use in his happy with his response to medication.    Review of Systems   Inattention     Physical Exam  Pt not in any acute distress    Plan:  1. Attention deficit disorder, unspecified type  Assessment & Plan:  -Chronic, stable.   -Continue with Ritalin LA 20 mg daily  -F/u in 3 months for additional refills.     Orders:  -     methylphenidate HCl (RITALIN LA) 20 MG 24 hr capsule; Take 1 capsule (20 mg total) by mouth once daily.  Dispense: 30 capsule; Refill: 0  -     methylphenidate HCl (RITALIN LA) 20 MG 24 hr capsule; Take 1 capsule (20 mg total) by mouth every morning.  Dispense: 30 capsule; Refill: 0  -     methylphenidate HCl (RITALIN LA) 20 MG 24 hr capsule; Take 1 capsule (20 mg total) by mouth every morning.  Dispense: 30 capsule; Refill: 0    2. Insomnia  -Chronic, stable  -Continue with current dose of Trazodone    3. NOHEMY  -Chronic, stable  -Continue to wear CPAP as recommended    Face to Face time with patient: 15 minutes of total time spent on the encounter, which includes face to face time and non-face to face time preparing to see the patient (eg, review of tests), Obtaining and/or reviewing separately obtained history, Documenting clinical information in the electronic or other health record, Independently interpreting results (not separately reported) and communicating results to the patient/family/caregiver, or Care coordination (not separately reported).     Each patient to whom he or she provides medical services by telemedicine is:  (1) informed of the relationship between the physician and  patient and the respective role of any other health care provider with respect to management of the patient; and (2) notified that he or she may decline to receive medical services by telemedicine and may withdraw from such care at any time.

## 2025-03-19 RX ORDER — TRAZODONE HYDROCHLORIDE 50 MG/1
50 TABLET ORAL NIGHTLY
Qty: 90 TABLET | Refills: 3 | Status: SHIPPED | OUTPATIENT
Start: 2025-03-19 | End: 2026-03-14

## 2025-03-19 NOTE — TELEPHONE ENCOUNTER
No care due was identified.  [facility] Embedded Care Due Messages. Reference number: [id number].   3/19/2025 3:03:38 PM CDT

## 2025-04-22 ENCOUNTER — PATIENT MESSAGE (OUTPATIENT)
Dept: OPTOMETRY | Facility: CLINIC | Age: 50
End: 2025-04-22
Payer: COMMERCIAL

## 2025-05-19 ENCOUNTER — PATIENT MESSAGE (OUTPATIENT)
Dept: INTERNAL MEDICINE | Facility: CLINIC | Age: 50
End: 2025-05-19
Payer: COMMERCIAL

## 2025-05-27 DIAGNOSIS — F98.8 ATTENTION DEFICIT DISORDER, UNSPECIFIED TYPE: ICD-10-CM

## 2025-05-27 RX ORDER — METHYLPHENIDATE HYDROCHLORIDE 20 MG/1
20 CAPSULE, EXTENDED RELEASE ORAL EVERY MORNING
Qty: 30 CAPSULE | Refills: 0 | Status: SHIPPED | OUTPATIENT
Start: 2025-05-27

## 2025-06-06 NOTE — TRANSFER OF CARE
"Anesthesia Transfer of Care Note    Patient: Chi Carr    Procedure(s) Performed: Procedure(s) (LRB):  COLONOSCOPY (N/A)    Patient location: PACU    Anesthesia Type: MAC    Transport from OR: Transported from OR on room air with adequate spontaneous ventilation    Post pain: adequate analgesia    Post assessment: no apparent anesthetic complications and tolerated procedure well    Post vital signs: stable    Level of consciousness: awake, alert and oriented    Nausea/Vomiting: no nausea/vomiting    Complications: none    Transfer of care protocol was followed      Last vitals: Visit Vitals  /75   Pulse 67   Temp 36.7 °C (98 °F)   Resp 18   Ht 5' 9" (1.753 m)   Wt 121.1 kg (267 lb)   SpO2 96%   BMI 39.43 kg/m²     "
Statement Selected

## 2025-08-14 ENCOUNTER — LAB VISIT (OUTPATIENT)
Dept: LAB | Facility: HOSPITAL | Age: 50
End: 2025-08-14
Attending: INTERNAL MEDICINE
Payer: COMMERCIAL

## 2025-08-14 DIAGNOSIS — Z00.00 ANNUAL PHYSICAL EXAM: ICD-10-CM

## 2025-08-14 LAB
ABSOLUTE EOSINOPHIL (OHS): 0.36 K/UL
ABSOLUTE MONOCYTE (OHS): 0.53 K/UL (ref 0.3–1)
ABSOLUTE NEUTROPHIL COUNT (OHS): 4.62 K/UL (ref 1.8–7.7)
ALBUMIN SERPL BCP-MCNC: 4.2 G/DL (ref 3.5–5.2)
ALP SERPL-CCNC: 63 UNIT/L (ref 40–150)
ALT SERPL W/O P-5'-P-CCNC: 33 UNIT/L (ref 0–55)
ANION GAP (OHS): 9 MMOL/L (ref 8–16)
AST SERPL-CCNC: 26 UNIT/L (ref 0–50)
BASOPHILS # BLD AUTO: 0.09 K/UL
BASOPHILS NFR BLD AUTO: 1.1 %
BILIRUB SERPL-MCNC: 0.4 MG/DL (ref 0.1–1)
BUN SERPL-MCNC: 15 MG/DL (ref 6–20)
CALCIUM SERPL-MCNC: 8.9 MG/DL (ref 8.7–10.5)
CHLORIDE SERPL-SCNC: 103 MMOL/L (ref 95–110)
CHOLEST SERPL-MCNC: 233 MG/DL (ref 120–199)
CHOLEST/HDLC SERPL: 6 {RATIO} (ref 2–5)
CO2 SERPL-SCNC: 23 MMOL/L (ref 23–29)
CREAT SERPL-MCNC: 1 MG/DL (ref 0.5–1.4)
EAG (OHS): 108 MG/DL (ref 68–131)
ERYTHROCYTE [DISTWIDTH] IN BLOOD BY AUTOMATED COUNT: 12.5 % (ref 11.5–14.5)
GFR SERPLBLD CREATININE-BSD FMLA CKD-EPI: >60 ML/MIN/1.73/M2
GLUCOSE SERPL-MCNC: 91 MG/DL (ref 70–110)
HBA1C MFR BLD: 5.4 % (ref 4–5.6)
HCT VFR BLD AUTO: 43.9 % (ref 40–54)
HDLC SERPL-MCNC: 39 MG/DL (ref 40–75)
HDLC SERPL: 16.7 % (ref 20–50)
HGB BLD-MCNC: 15.1 GM/DL (ref 14–18)
IMM GRANULOCYTES # BLD AUTO: 0.02 K/UL (ref 0–0.04)
IMM GRANULOCYTES NFR BLD AUTO: 0.2 % (ref 0–0.5)
LDLC SERPL CALC-MCNC: 166.4 MG/DL (ref 63–159)
LYMPHOCYTES # BLD AUTO: 2.91 K/UL (ref 1–4.8)
MCH RBC QN AUTO: 29.7 PG (ref 27–31)
MCHC RBC AUTO-ENTMCNC: 34.4 G/DL (ref 32–36)
MCV RBC AUTO: 86 FL (ref 82–98)
NONHDLC SERPL-MCNC: 194 MG/DL
NUCLEATED RBC (/100WBC) (OHS): 0 /100 WBC
PLATELET # BLD AUTO: 204 K/UL (ref 150–450)
PMV BLD AUTO: 9.8 FL (ref 9.2–12.9)
POTASSIUM SERPL-SCNC: 4.2 MMOL/L (ref 3.5–5.1)
PROT SERPL-MCNC: 7 GM/DL (ref 6–8.4)
PSA SERPL-MCNC: 0.24 NG/ML
RBC # BLD AUTO: 5.08 M/UL (ref 4.6–6.2)
RELATIVE EOSINOPHIL (OHS): 4.2 %
RELATIVE LYMPHOCYTE (OHS): 34.1 % (ref 18–48)
RELATIVE MONOCYTE (OHS): 6.2 % (ref 4–15)
RELATIVE NEUTROPHIL (OHS): 54.2 % (ref 38–73)
SODIUM SERPL-SCNC: 135 MMOL/L (ref 136–145)
TRIGL SERPL-MCNC: 138 MG/DL (ref 30–150)
TSH SERPL-ACNC: 0.94 UIU/ML (ref 0.4–4)
WBC # BLD AUTO: 8.53 K/UL (ref 3.9–12.7)

## 2025-08-14 PROCEDURE — 83036 HEMOGLOBIN GLYCOSYLATED A1C: CPT

## 2025-08-14 PROCEDURE — 84443 ASSAY THYROID STIM HORMONE: CPT

## 2025-08-14 PROCEDURE — 84155 ASSAY OF PROTEIN SERUM: CPT

## 2025-08-14 PROCEDURE — 84153 ASSAY OF PSA TOTAL: CPT

## 2025-08-14 PROCEDURE — 82465 ASSAY BLD/SERUM CHOLESTEROL: CPT

## 2025-08-14 PROCEDURE — 85025 COMPLETE CBC W/AUTO DIFF WBC: CPT

## 2025-08-14 PROCEDURE — 36415 COLL VENOUS BLD VENIPUNCTURE: CPT | Mod: PO

## 2025-08-22 ENCOUNTER — OFFICE VISIT (OUTPATIENT)
Dept: INTERNAL MEDICINE | Facility: CLINIC | Age: 50
End: 2025-08-22
Payer: COMMERCIAL

## 2025-08-22 VITALS
BODY MASS INDEX: 41.78 KG/M2 | DIASTOLIC BLOOD PRESSURE: 82 MMHG | HEART RATE: 60 BPM | RESPIRATION RATE: 18 BRPM | TEMPERATURE: 98 F | WEIGHT: 282.06 LBS | OXYGEN SATURATION: 97 % | SYSTOLIC BLOOD PRESSURE: 130 MMHG | HEIGHT: 69 IN

## 2025-08-22 DIAGNOSIS — Z00.00 ANNUAL PHYSICAL EXAM: Primary | ICD-10-CM

## 2025-08-22 DIAGNOSIS — F98.8 ATTENTION DEFICIT DISORDER, UNSPECIFIED TYPE: ICD-10-CM

## 2025-08-22 DIAGNOSIS — M25.562 CHRONIC PAIN OF BOTH KNEES: ICD-10-CM

## 2025-08-22 DIAGNOSIS — I10 PRIMARY HYPERTENSION: ICD-10-CM

## 2025-08-22 DIAGNOSIS — M25.561 CHRONIC PAIN OF BOTH KNEES: ICD-10-CM

## 2025-08-22 DIAGNOSIS — G47.33 OSA (OBSTRUCTIVE SLEEP APNEA): ICD-10-CM

## 2025-08-22 DIAGNOSIS — G47.00 INSOMNIA, UNSPECIFIED TYPE: ICD-10-CM

## 2025-08-22 DIAGNOSIS — G89.29 CHRONIC PAIN OF BOTH KNEES: ICD-10-CM

## 2025-08-22 DIAGNOSIS — E66.813 OBESITY, CLASS III, BMI 40-49.9 (MORBID OBESITY): ICD-10-CM

## 2025-08-22 PROCEDURE — 99999 PR PBB SHADOW E&M-EST. PATIENT-LVL III: CPT | Mod: PBBFAC,,, | Performed by: INTERNAL MEDICINE

## 2025-08-22 RX ORDER — METHYLPHENIDATE HYDROCHLORIDE 20 MG/1
20 CAPSULE, EXTENDED RELEASE ORAL EVERY MORNING
Qty: 30 CAPSULE | Refills: 0 | Status: SHIPPED | OUTPATIENT
Start: 2025-08-22

## 2025-08-22 RX ORDER — TRAZODONE HYDROCHLORIDE 100 MG/1
100 TABLET ORAL NIGHTLY
Qty: 90 TABLET | Refills: 3 | Status: SHIPPED | OUTPATIENT
Start: 2025-08-22

## 2025-08-22 RX ORDER — METHYLPHENIDATE HYDROCHLORIDE 20 MG/1
20 CAPSULE, EXTENDED RELEASE ORAL EVERY MORNING
Qty: 30 CAPSULE | Refills: 0 | Status: SHIPPED | OUTPATIENT
Start: 2025-09-22

## 2025-08-22 RX ORDER — METHYLPHENIDATE HYDROCHLORIDE 20 MG/1
20 CAPSULE, EXTENDED RELEASE ORAL DAILY
Qty: 30 CAPSULE | Refills: 0 | Status: SHIPPED | OUTPATIENT
Start: 2025-10-22